# Patient Record
Sex: FEMALE | NOT HISPANIC OR LATINO
[De-identification: names, ages, dates, MRNs, and addresses within clinical notes are randomized per-mention and may not be internally consistent; named-entity substitution may affect disease eponyms.]

---

## 2017-10-17 ENCOUNTER — APPOINTMENT (OUTPATIENT)
Dept: ANTEPARTUM | Facility: CLINIC | Age: 47
End: 2017-10-17
Payer: COMMERCIAL

## 2017-10-17 ENCOUNTER — ASOB RESULT (OUTPATIENT)
Age: 47
End: 2017-10-17

## 2017-10-17 PROCEDURE — 76857 US EXAM PELVIC LIMITED: CPT

## 2017-10-17 PROCEDURE — 76830 TRANSVAGINAL US NON-OB: CPT

## 2017-12-21 ENCOUNTER — OUTPATIENT (OUTPATIENT)
Dept: OUTPATIENT SERVICES | Facility: HOSPITAL | Age: 47
LOS: 1 days | Discharge: HOME | End: 2017-12-21
Payer: COMMERCIAL

## 2017-12-21 ENCOUNTER — RESULT REVIEW (OUTPATIENT)
Age: 47
End: 2017-12-21

## 2017-12-21 VITALS
RESPIRATION RATE: 14 BRPM | SYSTOLIC BLOOD PRESSURE: 113 MMHG | OXYGEN SATURATION: 100 % | TEMPERATURE: 98 F | HEART RATE: 85 BPM | DIASTOLIC BLOOD PRESSURE: 65 MMHG

## 2017-12-21 VITALS
TEMPERATURE: 98 F | HEART RATE: 65 BPM | WEIGHT: 130.07 LBS | SYSTOLIC BLOOD PRESSURE: 103 MMHG | HEIGHT: 64 IN | OXYGEN SATURATION: 100 % | DIASTOLIC BLOOD PRESSURE: 48 MMHG | RESPIRATION RATE: 16 BRPM

## 2017-12-21 DIAGNOSIS — Z87.59 PERSONAL HISTORY OF OTHER COMPLICATIONS OF PREGNANCY, CHILDBIRTH AND THE PUERPERIUM: Chronic | ICD-10-CM

## 2017-12-21 LAB
ABO RH CONFIRMATION: SIGNIFICANT CHANGE UP
BLD GP AB SCN SERPL QL: SIGNIFICANT CHANGE UP
HCG UR QL: NEGATIVE — SIGNIFICANT CHANGE UP
HCT VFR BLD CALC: 33 % — LOW (ref 34.5–45)
HGB BLD-MCNC: 10.4 G/DL — LOW (ref 11.5–15.5)
MCHC RBC-ENTMCNC: 25.4 PG — LOW (ref 27–34)
MCHC RBC-ENTMCNC: 31.6 GM/DL — LOW (ref 32–36)
MCV RBC AUTO: 80.2 FL — SIGNIFICANT CHANGE UP (ref 80–100)
PLATELET # BLD AUTO: 226 K/UL — SIGNIFICANT CHANGE UP (ref 150–400)
RBC # BLD: 4.11 M/UL — SIGNIFICANT CHANGE UP (ref 3.8–5.2)
RBC # FLD: 16.3 % — HIGH (ref 10.3–14.5)
TYPE + AB SCN PNL BLD: SIGNIFICANT CHANGE UP
WBC # BLD: 6.8 K/UL — SIGNIFICANT CHANGE UP (ref 3.8–10.5)
WBC # FLD AUTO: 6.8 K/UL — SIGNIFICANT CHANGE UP (ref 3.8–10.5)

## 2017-12-21 PROCEDURE — 88305 TISSUE EXAM BY PATHOLOGIST: CPT | Mod: 26

## 2017-12-21 RX ORDER — OXYCODONE HYDROCHLORIDE 5 MG/1
5 TABLET ORAL EVERY 4 HOURS
Qty: 0 | Refills: 0 | Status: DISCONTINUED | OUTPATIENT
Start: 2017-12-21 | End: 2017-12-21

## 2017-12-21 RX ORDER — FENTANYL CITRATE 50 UG/ML
50 INJECTION INTRAVENOUS
Qty: 0 | Refills: 0 | Status: DISCONTINUED | OUTPATIENT
Start: 2017-12-21 | End: 2017-12-21

## 2017-12-21 RX ORDER — ONDANSETRON 8 MG/1
4 TABLET, FILM COATED ORAL ONCE
Qty: 0 | Refills: 0 | Status: DISCONTINUED | OUTPATIENT
Start: 2017-12-21 | End: 2017-12-21

## 2017-12-21 RX ORDER — SODIUM CHLORIDE 9 MG/ML
1000 INJECTION INTRAMUSCULAR; INTRAVENOUS; SUBCUTANEOUS
Qty: 0 | Refills: 0 | Status: DISCONTINUED | OUTPATIENT
Start: 2017-12-21 | End: 2017-12-21

## 2017-12-21 RX ORDER — ACETAMINOPHEN 500 MG
1000 TABLET ORAL ONCE
Qty: 0 | Refills: 0 | Status: COMPLETED | OUTPATIENT
Start: 2017-12-21 | End: 2017-12-21

## 2017-12-21 RX ORDER — OXYCODONE HYDROCHLORIDE 5 MG/1
1 TABLET ORAL
Qty: 0 | Refills: 0 | COMMUNITY
Start: 2017-12-21

## 2017-12-21 RX ORDER — FENTANYL CITRATE 50 UG/ML
5 INJECTION INTRAVENOUS
Qty: 0 | Refills: 0 | COMMUNITY
Start: 2017-12-21

## 2017-12-21 RX ADMIN — Medication 1000 MILLIGRAM(S): at 08:42

## 2017-12-21 RX ADMIN — Medication 400 MILLIGRAM(S): at 08:32

## 2017-12-21 RX ADMIN — SODIUM CHLORIDE 75 MILLILITER(S): 9 INJECTION INTRAMUSCULAR; INTRAVENOUS; SUBCUTANEOUS at 08:33

## 2017-12-21 NOTE — BRIEF OPERATIVE NOTE - PROCEDURE
<<-----Click on this checkbox to enter Procedure Ablation of endometrial tissue with NovaSure  12/21/2017    Active  MELITON  Diagnostic hysteroscopy with dilation and curettage of uterus  12/21/2017    Active  [email or web address]

## 2017-12-26 LAB — SURGICAL PATHOLOGY FINAL REPORT - CH: SIGNIFICANT CHANGE UP

## 2017-12-30 DIAGNOSIS — N92.0 EXCESSIVE AND FREQUENT MENSTRUATION WITH REGULAR CYCLE: ICD-10-CM

## 2017-12-30 DIAGNOSIS — N84.0 POLYP OF CORPUS UTERI: ICD-10-CM

## 2018-01-01 ENCOUNTER — TRANSCRIPTION ENCOUNTER (OUTPATIENT)
Age: 48
End: 2018-01-01

## 2018-11-05 ENCOUNTER — TRANSCRIPTION ENCOUNTER (OUTPATIENT)
Age: 48
End: 2018-11-05

## 2019-08-20 PROBLEM — N92.0 EXCESSIVE AND FREQUENT MENSTRUATION WITH REGULAR CYCLE: Chronic | Status: ACTIVE | Noted: 2017-12-21

## 2019-10-24 ENCOUNTER — APPOINTMENT (OUTPATIENT)
Dept: FAMILY MEDICINE | Facility: CLINIC | Age: 49
End: 2019-10-24
Payer: COMMERCIAL

## 2019-10-24 VITALS
SYSTOLIC BLOOD PRESSURE: 110 MMHG | DIASTOLIC BLOOD PRESSURE: 68 MMHG | HEART RATE: 68 BPM | BODY MASS INDEX: 21.17 KG/M2 | HEIGHT: 64 IN | WEIGHT: 124 LBS

## 2019-10-24 PROCEDURE — 90471 IMMUNIZATION ADMIN: CPT

## 2019-10-24 PROCEDURE — 36415 COLL VENOUS BLD VENIPUNCTURE: CPT

## 2019-10-24 PROCEDURE — 99396 PREV VISIT EST AGE 40-64: CPT | Mod: 25

## 2019-10-24 PROCEDURE — 90686 IIV4 VACC NO PRSV 0.5 ML IM: CPT

## 2019-10-24 NOTE — PHYSICAL EXAM
[General Appearance - Well-Appearing] : healthy appearing [General Appearance - Alert] : alert [General Appearance - In No Acute Distress] : in no acute distress [PERRL With Normal Accommodation] : pupils were equal in size, round, and reactive to light [Extraocular Movements] : extraocular movements were intact [Sclera] : the sclera and conjunctiva were normal [Both Tympanic Membranes Were Examined] : both tympanic membranes were normal [Oropharynx] : the oropharynx was normal [Respiration, Rhythm And Depth] : normal respiratory rhythm and effort [Thyroid Diffuse Enlargement] : the thyroid was not enlarged [Auscultation Breath Sounds / Voice Sounds] : lungs were clear to auscultation bilaterally [Heart Rate And Rhythm] : heart rate was normal and rhythm regular [Edema] : there was no peripheral edema [Heart Sounds] : normal S1 and S2 [Abdomen Soft] : soft [Bowel Sounds] : normal bowel sounds [Abdomen Mass (___ Cm)] : no abdominal mass palpated [Cervical Lymph Nodes Enlarged Posterior Bilaterally] : posterior cervical [Cervical Lymph Nodes Enlarged Anterior Bilaterally] : anterior cervical [Supraclavicular Lymph Nodes Enlarged Bilaterally] : supraclavicular [No Spinal Tenderness] : no spinal tenderness [Abnormal Walk] : normal gait [Involuntary Movements] : no involuntary movements were seen [Skin Turgor] : normal skin turgor [No Focal Deficits] : no focal deficits [] : no rash [Deep Tendon Reflexes (DTR)] : deep tendon reflexes were 2+ and symmetric [Oriented To Time, Place, And Person] : oriented to person, place, and time

## 2019-10-27 NOTE — HEALTH RISK ASSESSMENT
[Excellent] : ~his/her~ current health as excellent [Very Good] : ~his/her~  mood as very good [FreeTextEntry1] : her sister recently diagnosis with low grade Lymphoma  [] : No [Yes] : Yes [0] : 2) Feeling down, depressed, or hopeless: Not at all (0) [de-identified] : GYN   Dr. Velarde [de-identified] : SOCIAL  [[id number]] : 0 [Patient reported mammogram was normal] : Patient reported mammogram was normal [Patient reported colonoscopy was normal] : Patient reported colonoscopy was normal [Change in mental status noted] : No change in mental status noted [None] : None [With Family] : lives with family [Employed] : employed [College] : College [# Of Children ___] : has [unfilled] children [Feels Safe at Home] : Feels safe at home [Fully functional (bathing, dressing, toileting, transferring, walking, feeding)] : Fully functional (bathing, dressing, toileting, transferring, walking, feeding) [Fully functional (using the telephone, shopping, preparing meals, housekeeping, doing laundry, using] : Fully functional and needs no help or supervision to perform IADLs (using the telephone, shopping, preparing meals, housekeeping, doing laundry, using transportation, managing medications and managing finances) [Reports changes in hearing] : Reports no changes in hearing [Smoke Detector] : smoke detector [Carbon Monoxide Detector] : carbon monoxide detector [Seat Belt] :  uses seat belt [Travel to Developing Areas] : does not  travel to developing areas [MammogramDate] : 2014  [ColonoscopyDate] : 2016  [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

## 2019-10-27 NOTE — HISTORY OF PRESENT ILLNESS
[de-identified] : Since has been well and \par Denies any recent ER visits/hospitalizations/MSK injuries.\par EM ablation for heavy menses last year.  \par When queried "maybe one" UTI in recent year.  Never consulted with Urology. \par Sister in [place] recently diagnosed with Lymphoma and her concerns heightened for her health.  [FreeTextEntry1] :  In review: Oct. 2016 \par Audrey presents to establish care being referred to me by patient here, Shanna Tamayo.\par She is an affable 45-year-old female, whose only significant medical history includes recurrent and recent UTI.  She was seen in urgent care and is now on Macrobid.  She has been on prophylactic Macrobid as per her GYN for a few years.  Never and seen in consultation with urology however.\par \par HM: Mammo  2014  tells me WNL...needs FU  GYN  in [place]  Dr. Barnett \par Colonoscopy:  3 years ago; early screening due to complaints of  bloating.  No concerns.  Dietary changes with regard to wheat seem to have improved bloating symptoms. 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

## 2019-10-27 NOTE — ASSESSMENT
[Normal Weight (BMI <25)] : normal weight - BMI <25 [Non - Smoker] : non-smoker [FreeTextEntry2] : continue as described in HPI  [FreeTextEntry1] :  Well exam for 48 year old WF with PMH as stated in HPI / active list. \par \par Management : \par \par See HPI and Plan\par \par Labs in office today.   Will advise. \par \par Best wishes offered !\par

## 2019-10-29 LAB
ALBUMIN SERPL ELPH-MCNC: 4.7 G/DL
ALP BLD-CCNC: 41 U/L
ALT SERPL-CCNC: 11 U/L
ANION GAP SERPL CALC-SCNC: 12 MMOL/L
APPEARANCE: CLEAR
AST SERPL-CCNC: 16 U/L
BASOPHILS # BLD AUTO: 0.03 K/UL
BASOPHILS NFR BLD AUTO: 0.4 %
BILIRUB SERPL-MCNC: 0.2 MG/DL
BILIRUBIN URINE: NEGATIVE
BLOOD URINE: NEGATIVE
BUN SERPL-MCNC: 18 MG/DL
CALCIUM SERPL-MCNC: 9.8 MG/DL
CHLORIDE SERPL-SCNC: 102 MMOL/L
CHOLEST SERPL-MCNC: 223 MG/DL
CHOLEST/HDLC SERPL: 2.7 RATIO
CO2 SERPL-SCNC: 25 MMOL/L
COLOR: YELLOW
CREAT SERPL-MCNC: 0.97 MG/DL
EOSINOPHIL # BLD AUTO: 0.05 K/UL
EOSINOPHIL NFR BLD AUTO: 0.6 %
ESTIMATED AVERAGE GLUCOSE: 97 MG/DL
GLUCOSE QUALITATIVE U: NEGATIVE
GLUCOSE SERPL-MCNC: 79 MG/DL
HBA1C MFR BLD HPLC: 5 %
HCT VFR BLD CALC: 41.8 %
HDLC SERPL-MCNC: 84 MG/DL
HGB BLD-MCNC: 13.9 G/DL
IMM GRANULOCYTES NFR BLD AUTO: 0.2 %
KETONES URINE: NEGATIVE
LDLC SERPL CALC-MCNC: 129 MG/DL
LEUKOCYTE ESTERASE URINE: NEGATIVE
LYMPHOCYTES # BLD AUTO: 1.91 K/UL
LYMPHOCYTES NFR BLD AUTO: 22.4 %
MAN DIFF?: NORMAL
MCHC RBC-ENTMCNC: 31 PG
MCHC RBC-ENTMCNC: 33.3 GM/DL
MCV RBC AUTO: 93.1 FL
MONOCYTES # BLD AUTO: 0.62 K/UL
MONOCYTES NFR BLD AUTO: 7.3 %
NEUTROPHILS # BLD AUTO: 5.89 K/UL
NEUTROPHILS NFR BLD AUTO: 69.1 %
NITRITE URINE: NEGATIVE
PH URINE: 7
PLATELET # BLD AUTO: 267 K/UL
POTASSIUM SERPL-SCNC: 4.6 MMOL/L
PROT SERPL-MCNC: 6.6 G/DL
PROTEIN URINE: NORMAL
RBC # BLD: 4.49 M/UL
RBC # FLD: 12.5 %
SODIUM SERPL-SCNC: 139 MMOL/L
SPECIFIC GRAVITY URINE: 1.03
TRIGL SERPL-MCNC: 51 MG/DL
TSH SERPL-ACNC: 2.1 UIU/ML
UROBILINOGEN URINE: NORMAL
WBC # FLD AUTO: 8.52 K/UL

## 2019-11-07 ENCOUNTER — TRANSCRIPTION ENCOUNTER (OUTPATIENT)
Age: 49
End: 2019-11-07

## 2019-12-13 ENCOUNTER — TRANSCRIPTION ENCOUNTER (OUTPATIENT)
Age: 49
End: 2019-12-13

## 2019-12-20 ENCOUNTER — TRANSCRIPTION ENCOUNTER (OUTPATIENT)
Age: 49
End: 2019-12-20

## 2019-12-23 ENCOUNTER — APPOINTMENT (OUTPATIENT)
Dept: FAMILY MEDICINE | Facility: CLINIC | Age: 49
End: 2019-12-23
Payer: COMMERCIAL

## 2019-12-23 VITALS
SYSTOLIC BLOOD PRESSURE: 127 MMHG | BODY MASS INDEX: 22.02 KG/M2 | HEIGHT: 64 IN | TEMPERATURE: 99.4 F | DIASTOLIC BLOOD PRESSURE: 72 MMHG | WEIGHT: 129 LBS | HEART RATE: 89 BPM

## 2019-12-23 PROCEDURE — 99214 OFFICE O/P EST MOD 30 MIN: CPT

## 2019-12-28 NOTE — ASSESSMENT
[FreeTextEntry1] :  Given prolonged duration of symptoms  and PE will allow for antibiotic therapy.\par \par Supportive and conservative therapies reviewed and advised: to consider:\par Increase fluids  and REST! \par Consider saline nasal spray/ irrigation  3-4 times daily ie: Ocean or Ayr\par Salt water gargles 3-4 times daily\par Cool mist humidifier Vs steam humification of shower;  consider adding eucalyptus oil drops to shower pan \par Vicks vapo rub to chest \par Acetaminophen/Advil for fever,  headache, myalgias\par Add honey /lemon to warm angel luis. \par Cover your mouth when coughing and wash hands frequently to prevent spread to others.\par \par FU in 2-3 days if fails to improve or symptoms worse.\par

## 2019-12-28 NOTE — HISTORY OF PRESENT ILLNESS
[Cold Symptoms] : cold symptoms [___ Weeks ago] :  [unfilled] weeks ago [Cough] : cough [Chills] : chills [Fatigue] : fatigue [Headache] : headache [OTC Remedies] : OTC remedies [At Night] : at night [Worsening] : worsening [Moderate] : moderate [Congestion] : no congestion [Sore Throat] : no sore throat [Paroxysmal] : paroxysmal [Wheezing] : no wheezing [Anorexia] : no anorexia [Shortness Of Breath] : no shortness of breath [Earache] : no earache

## 2019-12-28 NOTE — PHYSICAL EXAM
[Normal Sclera/Conjunctiva] : normal sclera/conjunctiva [No Acute Distress] : no acute distress [Clear to Auscultation] : lungs were clear to auscultation bilaterally [No Lymphadenopathy] : no lymphadenopathy [No Accessory Muscle Use] : no accessory muscle use [Normal S1, S2] : normal S1 and S2 [Regular Rhythm] : with a regular rhythm [Soft] : abdomen soft [Non Tender] : non-tender [No Rash] : no rash [No Focal Deficits] : no focal deficits [Alert and Oriented x3] : oriented to person, place, and time [Normal Insight/Judgement] : insight and judgment were intact [de-identified] : hyperemic  pharynx  glistening  [de-identified] : ill appearing  non toxic  [de-identified] : war and dry

## 2020-01-25 ENCOUNTER — TRANSCRIPTION ENCOUNTER (OUTPATIENT)
Age: 50
End: 2020-01-25

## 2020-01-27 ENCOUNTER — APPOINTMENT (OUTPATIENT)
Dept: FAMILY MEDICINE | Facility: CLINIC | Age: 50
End: 2020-01-27
Payer: COMMERCIAL

## 2020-01-27 VITALS
SYSTOLIC BLOOD PRESSURE: 90 MMHG | HEART RATE: 80 BPM | DIASTOLIC BLOOD PRESSURE: 60 MMHG | TEMPERATURE: 98.3 F | HEIGHT: 64 IN

## 2020-01-27 PROCEDURE — 99214 OFFICE O/P EST MOD 30 MIN: CPT

## 2020-01-27 RX ORDER — ONDANSETRON 4 MG/1
4 TABLET ORAL
Qty: 6 | Refills: 0 | Status: COMPLETED | COMMUNITY
Start: 2020-01-25

## 2020-01-27 RX ORDER — OSELTAMIVIR PHOSPHATE 75 MG/1
75 CAPSULE ORAL
Qty: 10 | Refills: 0 | Status: COMPLETED | COMMUNITY
Start: 2020-01-25

## 2020-01-27 RX ORDER — IPRATROPIUM BROMIDE 42 UG/1
0.06 SPRAY NASAL
Qty: 15 | Refills: 0 | Status: COMPLETED | COMMUNITY
Start: 2020-01-25

## 2020-01-29 NOTE — PHYSICAL EXAM
[No Acute Distress] : no acute distress [Normal Sclera/Conjunctiva] : normal sclera/conjunctiva [No Lymphadenopathy] : no lymphadenopathy [No Accessory Muscle Use] : no accessory muscle use [Clear to Auscultation] : lungs were clear to auscultation bilaterally [Regular Rhythm] : with a regular rhythm [Normal S1, S2] : normal S1 and S2 [No Rash] : no rash [No Focal Deficits] : no focal deficits [Alert and Oriented x3] : oriented to person, place, and time [de-identified] : calm and engaging  [de-identified] : camilla [de-identified] : warm and dry

## 2020-01-29 NOTE — HISTORY OF PRESENT ILLNESS
[Cough] : cough [Sore Throat] : sore throat [Fever] : fever [Mild] : mild [___ Days ago] : [unfilled] days ago [Congestion] : no congestion [Chills] : no chills [Fatigue] : not fatigue [Improving] : improving [FreeTextEntry8] : pt went to a urgent care on Saturday the prescribe her Tamiflu she is being taking Tamiflu for 3 days she is feeling better.

## 2020-02-05 ENCOUNTER — FORM ENCOUNTER (OUTPATIENT)
Age: 50
End: 2020-02-05

## 2020-02-06 ENCOUNTER — APPOINTMENT (OUTPATIENT)
Dept: FAMILY MEDICINE | Facility: CLINIC | Age: 50
End: 2020-02-06
Payer: COMMERCIAL

## 2020-02-06 ENCOUNTER — APPOINTMENT (OUTPATIENT)
Dept: RADIOLOGY | Facility: CLINIC | Age: 50
End: 2020-02-06
Payer: COMMERCIAL

## 2020-02-06 ENCOUNTER — OUTPATIENT (OUTPATIENT)
Dept: OUTPATIENT SERVICES | Facility: HOSPITAL | Age: 50
LOS: 1 days | End: 2020-02-06
Payer: COMMERCIAL

## 2020-02-06 VITALS
SYSTOLIC BLOOD PRESSURE: 110 MMHG | WEIGHT: 123 LBS | BODY MASS INDEX: 21 KG/M2 | HEART RATE: 91 BPM | OXYGEN SATURATION: 98 % | DIASTOLIC BLOOD PRESSURE: 70 MMHG | HEIGHT: 64 IN

## 2020-02-06 DIAGNOSIS — R05 COUGH: ICD-10-CM

## 2020-02-06 DIAGNOSIS — Z87.59 PERSONAL HISTORY OF OTHER COMPLICATIONS OF PREGNANCY, CHILDBIRTH AND THE PUERPERIUM: Chronic | ICD-10-CM

## 2020-02-06 PROCEDURE — 71046 X-RAY EXAM CHEST 2 VIEWS: CPT

## 2020-02-06 PROCEDURE — 99214 OFFICE O/P EST MOD 30 MIN: CPT

## 2020-02-06 PROCEDURE — 71046 X-RAY EXAM CHEST 2 VIEWS: CPT | Mod: 26

## 2020-02-06 NOTE — HISTORY OF PRESENT ILLNESS
[Severe] : severe [___ Weeks ago] :  [unfilled] weeks ago [Constant] : constant [Cough] : cough [Fatigue] : fatigue [Activity] : with activity [At Night] : at night [In Morning] : in the morning [Worsening] : worsening [Congestion] : no congestion [Sore Throat] : no sore throat [Wheezing] : no wheezing [Chills] : no chills [Anorexia] : no anorexia [Earache] : no earache [Shortness Of Breath] : no shortness of breath [Headache] : no headache [FreeTextEntry5] : NONE [Fever] : no fever [FreeTextEntry8] : Last seen for same JANUARY 27.\par DID improve "a bit" but cough persists sometimes productive for clear secretions. \par NO SOB  Poor sleep. \par Steam  eases symptoms.

## 2020-02-06 NOTE — REVIEW OF SYSTEMS
[Chills] : chills [Negative] : Neurological [Fever] : no fever [Skin Rash] : no skin rash [FreeTextEntry7] : poor appetite

## 2020-02-06 NOTE — ASSESSMENT
[FreeTextEntry1] : COUGH  likely post viral   given prolonged duration will obtain CXR\par \par COugh suppressant provided.

## 2020-02-06 NOTE — PHYSICAL EXAM
[No Acute Distress] : no acute distress [No Lymphadenopathy] : no lymphadenopathy [Normal Sclera/Conjunctiva] : normal sclera/conjunctiva [No Accessory Muscle Use] : no accessory muscle use [Clear to Auscultation] : lungs were clear to auscultation bilaterally [Regular Rhythm] : with a regular rhythm [No Rash] : no rash [Normal S1, S2] : normal S1 and S2 [No Focal Deficits] : no focal deficits [Alert and Oriented x3] : oriented to person, place, and time [de-identified] : non toxic appearing   [de-identified] : OMM  hyperemic pharynx  glistening  [de-identified] : decreased BS  [de-identified] : morris

## 2020-10-26 ENCOUNTER — APPOINTMENT (OUTPATIENT)
Dept: FAMILY MEDICINE | Facility: CLINIC | Age: 50
End: 2020-10-26

## 2020-11-10 ENCOUNTER — APPOINTMENT (OUTPATIENT)
Dept: FAMILY MEDICINE | Facility: CLINIC | Age: 50
End: 2020-11-10
Payer: COMMERCIAL

## 2020-11-10 ENCOUNTER — LABORATORY RESULT (OUTPATIENT)
Age: 50
End: 2020-11-10

## 2020-11-10 VITALS
TEMPERATURE: 97.1 F | BODY MASS INDEX: 22.2 KG/M2 | OXYGEN SATURATION: 99 % | RESPIRATION RATE: 12 BRPM | HEIGHT: 64 IN | DIASTOLIC BLOOD PRESSURE: 80 MMHG | HEART RATE: 71 BPM | SYSTOLIC BLOOD PRESSURE: 112 MMHG | WEIGHT: 130 LBS

## 2020-11-10 PROCEDURE — 36415 COLL VENOUS BLD VENIPUNCTURE: CPT

## 2020-11-10 PROCEDURE — 99396 PREV VISIT EST AGE 40-64: CPT | Mod: 25

## 2020-11-10 RX ORDER — BENZONATATE 100 MG/1
100 CAPSULE ORAL
Qty: 30 | Refills: 0 | Status: DISCONTINUED | COMMUNITY
Start: 2020-01-30 | End: 2020-11-10

## 2020-11-10 RX ORDER — AZITHROMYCIN 250 MG/1
250 TABLET, FILM COATED ORAL
Qty: 1 | Refills: 0 | Status: DISCONTINUED | COMMUNITY
Start: 2020-01-30 | End: 2020-11-10

## 2020-11-10 RX ORDER — GUAIFENESIN AND CODEINE PHOSPHATE 10; 100 MG/5ML; MG/5ML
100-10 SOLUTION ORAL
Qty: 1 | Refills: 0 | Status: DISCONTINUED | COMMUNITY
Start: 2020-02-06 | End: 2020-11-10

## 2020-11-10 RX ORDER — AZITHROMYCIN 250 MG/1
250 TABLET, FILM COATED ORAL
Qty: 1 | Refills: 0 | Status: DISCONTINUED | COMMUNITY
Start: 2019-12-23 | End: 2020-11-10

## 2020-11-10 RX ORDER — PROMETHAZINE HYDROCHLORIDE 6.25 MG/5ML
6.25 SOLUTION ORAL
Qty: 118 | Refills: 0 | Status: DISCONTINUED | COMMUNITY
Start: 2019-12-23 | End: 2020-11-10

## 2020-11-12 LAB
ALBUMIN SERPL ELPH-MCNC: 4.8 G/DL
ALP BLD-CCNC: 50 U/L
ALT SERPL-CCNC: 10 U/L
ANION GAP SERPL CALC-SCNC: 11 MMOL/L
APPEARANCE: CLEAR
AST SERPL-CCNC: 19 U/L
BASOPHILS # BLD AUTO: 0.04 K/UL
BASOPHILS NFR BLD AUTO: 0.5 %
BILIRUB SERPL-MCNC: 0.4 MG/DL
BILIRUBIN URINE: NEGATIVE
BLOOD URINE: NEGATIVE
BUN SERPL-MCNC: 10 MG/DL
CALCIUM SERPL-MCNC: 9.9 MG/DL
CHLORIDE SERPL-SCNC: 102 MMOL/L
CHOLEST SERPL-MCNC: 242 MG/DL
CO2 SERPL-SCNC: 27 MMOL/L
COLOR: YELLOW
CREAT SERPL-MCNC: 0.64 MG/DL
EOSINOPHIL # BLD AUTO: 0.05 K/UL
EOSINOPHIL NFR BLD AUTO: 0.6 %
ESTIMATED AVERAGE GLUCOSE: 97 MG/DL
GLUCOSE QUALITATIVE U: NEGATIVE
GLUCOSE SERPL-MCNC: 84 MG/DL
HBA1C MFR BLD HPLC: 5 %
HCT VFR BLD CALC: 45.5 %
HDLC SERPL-MCNC: 101 MG/DL
HGB BLD-MCNC: 14.6 G/DL
IMM GRANULOCYTES NFR BLD AUTO: 0.2 %
KETONES URINE: NEGATIVE
LDLC SERPL CALC-MCNC: 129 MG/DL
LEUKOCYTE ESTERASE URINE: ABNORMAL
LYMPHOCYTES # BLD AUTO: 2.01 K/UL
LYMPHOCYTES NFR BLD AUTO: 22.9 %
MAN DIFF?: NORMAL
MCHC RBC-ENTMCNC: 31.3 PG
MCHC RBC-ENTMCNC: 32.1 GM/DL
MCV RBC AUTO: 97.4 FL
MONOCYTES # BLD AUTO: 0.51 K/UL
MONOCYTES NFR BLD AUTO: 5.8 %
NEUTROPHILS # BLD AUTO: 6.15 K/UL
NEUTROPHILS NFR BLD AUTO: 70 %
NITRITE URINE: NEGATIVE
NONHDLC SERPL-MCNC: 141 MG/DL
PH URINE: 6.5
PLATELET # BLD AUTO: 284 K/UL
POTASSIUM SERPL-SCNC: 5.1 MMOL/L
PROT SERPL-MCNC: 6.9 G/DL
PROTEIN URINE: NORMAL
RBC # BLD: 4.67 M/UL
RBC # FLD: 12.5 %
SODIUM SERPL-SCNC: 139 MMOL/L
SPECIFIC GRAVITY URINE: 1.02
TRIGL SERPL-MCNC: 58 MG/DL
TSH SERPL-ACNC: 2.05 UIU/ML
UROBILINOGEN URINE: NORMAL
WBC # FLD AUTO: 8.78 K/UL

## 2020-11-17 NOTE — ASSESSMENT
[FreeTextEntry1] :  Well exam for 49  year old female  with PMH as stated in HPI / active list. \par \par Management :   Has MAMMO req. from GYN\par                          Referred for colonoscopy \par Declines FLU vaccine as her concerns are heightened since she was so sick last year after the flu shot.\par advised to reconsider and educated\par \par \par Referral to URO GYN given. \par \par See HPI and Plan\par \par Labs in office today.   Will advise. \par \par Best wishes offered !\par

## 2020-11-17 NOTE — HISTORY OF PRESENT ILLNESS
[de-identified] : Since has been well and \par Denies any recent ER visits/hospitalizations/MSK injuries.\par EM ablation for heavy menses last year.  \par When queried "maybe one" UTI in recent year.  Never consulted with Urology. \par Sister in [place] recently diagnosed with Lymphoma and her concerns heightened for her health.  [FreeTextEntry1] :  In review: Oct. 2016 \par Audrey presents to establish care being referred to me by patient here, Shanna Tamayo.\par She is an affable 45-year-old female, whose only significant medical history includes recurrent and recent UTI.  She was seen in urgent care and is now on Macrobid.  She has been on prophylactic Macrobid as per her GYN for a few years.  Never and seen in consultation with urology however.\par \par HM: Mammo  2014  tells me WNL...needs FU  GYN  in [place]  Dr. Barnett \par Colonoscopy:  3 years ago; early screening due to complaints of  bloating.  No concerns.  Dietary changes with regard to wheat seem to have improved bloating symptoms. 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

## 2020-11-17 NOTE — HEALTH RISK ASSESSMENT
[0] : 2) Feeling down, depressed, or hopeless: Not at all (0) [Excellent] : ~his/her~ current health as excellent [Very Good] : ~his/her~  mood as very good [Yes] : Yes [Patient reported mammogram was normal] : Patient reported mammogram was normal [Patient reported colonoscopy was normal] : Patient reported colonoscopy was normal [None] : None [With Family] : lives with family [Employed] : employed [College] : College [# Of Children ___] : has [unfilled] children [Feels Safe at Home] : Feels safe at home [Fully functional (bathing, dressing, toileting, transferring, walking, feeding)] : Fully functional (bathing, dressing, toileting, transferring, walking, feeding) [Fully functional (using the telephone, shopping, preparing meals, housekeeping, doing laundry, using] : Fully functional and needs no help or supervision to perform IADLs (using the telephone, shopping, preparing meals, housekeeping, doing laundry, using transportation, managing medications and managing finances) [Smoke Detector] : smoke detector [Carbon Monoxide Detector] : carbon monoxide detector [Seat Belt] :  uses seat belt [] : No [de-identified] : GYN   Dr. Velarde [de-identified] : SOCIAL  [[id number]] : 0 [Change in mental status noted] : No change in mental status noted [Reports changes in hearing] : Reports no changes in hearing [Travel to Developing Areas] : does not  travel to developing areas [MammogramDate] : 2014  [MammogramComments] : declines screenings.  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

## 2020-11-17 NOTE — PHYSICAL EXAM
[General Appearance - Alert] : alert [General Appearance - Well-Appearing] : healthy appearing [Sclera] : the sclera and conjunctiva were normal [PERRL With Normal Accommodation] : pupils were equal in size, round, and reactive to light [Extraocular Movements] : extraocular movements were intact [Both Tympanic Membranes Were Examined] : both tympanic membranes were normal [Oropharynx] : the oropharynx was normal [Thyroid Diffuse Enlargement] : the thyroid was not enlarged [Respiration, Rhythm And Depth] : normal respiratory rhythm and effort [Auscultation Breath Sounds / Voice Sounds] : lungs were clear to auscultation bilaterally [Heart Rate And Rhythm] : heart rate was normal and rhythm regular [Heart Sounds] : normal S1 and S2 [Edema] : there was no peripheral edema [Bowel Sounds] : normal bowel sounds [Abdomen Soft] : soft [Abdomen Mass (___ Cm)] : no abdominal mass palpated [Cervical Lymph Nodes Enlarged Posterior Bilaterally] : posterior cervical [Cervical Lymph Nodes Enlarged Anterior Bilaterally] : anterior cervical [Supraclavicular Lymph Nodes Enlarged Bilaterally] : supraclavicular [No Spinal Tenderness] : no spinal tenderness [Abnormal Walk] : normal gait [Involuntary Movements] : no involuntary movements were seen [Skin Turgor] : normal skin turgor [] : no rash [Deep Tendon Reflexes (DTR)] : deep tendon reflexes were 2+ and symmetric [No Focal Deficits] : no focal deficits [Oriented To Time, Place, And Person] : oriented to person, place, and time [No CVA Tenderness] : no ~M costovertebral angle tenderness [FreeTextEntry1] : MASK

## 2021-03-15 RX ORDER — NITROFURANTOIN MACROCRYSTALS 100 MG/1
100 CAPSULE ORAL
Qty: 10 | Refills: 0 | Status: DISCONTINUED | COMMUNITY
Start: 2020-11-12 | End: 2021-03-15

## 2021-03-15 NOTE — LETTER BODY
[Dear  ___] : Dear  [unfilled], [I had the pleasure of evaluating your patient, [unfilled]. Thank you for referring Ms. [unfilled] for consultation for ___] : I had the pleasure of evaluating your patient, [unfilled]. Thank you for referring Ms. [unfilled] for consultation for [unfilled].

## 2021-03-15 NOTE — REASON FOR VISIT
[Initial Visit ___] : an initial visit for [unfilled] [Nocturia] : nocturia [Urinary Incontinence] : urinary incontinence [Recurrent Urinary Infections] : recurrent urinary infections

## 2021-03-18 ENCOUNTER — APPOINTMENT (OUTPATIENT)
Age: 51
End: 2021-03-18
Payer: COMMERCIAL

## 2021-03-18 ENCOUNTER — RESULT CHARGE (OUTPATIENT)
Age: 51
End: 2021-03-18

## 2021-03-18 VITALS
HEIGHT: 64 IN | DIASTOLIC BLOOD PRESSURE: 73 MMHG | SYSTOLIC BLOOD PRESSURE: 109 MMHG | WEIGHT: 125 LBS | BODY MASS INDEX: 21.34 KG/M2

## 2021-03-18 DIAGNOSIS — R35.1 NOCTURIA: ICD-10-CM

## 2021-03-18 LAB
BILIRUB UR QL STRIP: NEGATIVE
CLARITY UR: CLEAR
COLLECTION METHOD: NORMAL
GLUCOSE UR-MCNC: NEGATIVE
HCG UR QL: 0.2 EU/DL
HGB UR QL STRIP.AUTO: NEGATIVE
KETONES UR-MCNC: NEGATIVE
LEUKOCYTE ESTERASE UR QL STRIP: NEGATIVE
NITRITE UR QL STRIP: NEGATIVE
PH UR STRIP: 7
PROT UR STRIP-MCNC: NEGATIVE
SP GR UR STRIP: 1.01

## 2021-03-18 PROCEDURE — 99072 ADDL SUPL MATRL&STAF TM PHE: CPT

## 2021-03-18 PROCEDURE — 51701 INSERT BLADDER CATHETER: CPT

## 2021-03-18 PROCEDURE — 99205 OFFICE O/P NEW HI 60 MIN: CPT | Mod: 25

## 2021-03-18 NOTE — DISCUSSION/SUMMARY
[FreeTextEntry1] : 51 yo presenting with OAB symptoms, mostly at night. On exam normal PVR, no prolapse, negative CST. We reviewed her symptoms and exam findings. We discussed management options for overactive bladder including observation, behavorial modifications and bladder training, physical therapy and medications including anticholinergics and beta 3 agonists. We discussed if behavorial modifications and medications fail proceeding with urodynamics to further evaluate her symptoms. We discussed additional treatment options including sacral neuromodulation, PTNS and intra detrusor Botox. IUGA handout on overactive bladder and bladder training was given to her.\par \par Counseled on lifestyle modifications, fluid management and possible medications. Will start with lifestyle modification and fluid management and return in 2 months to reassess symptoms.  All questions were answered. \par

## 2021-03-18 NOTE — PHYSICAL EXAM
[No Acute Distress] : in no acute distress [Well developed] : well developed [Well Nourished] : ~L well nourished [Good Hygeine] : demonstrates good hygeine [Oriented x3] : oriented to person, place, and time [Normal Mood/Affect] : mood and affect are normal [No Edema] : ~T edema was not present [No Lesions] : no lesions were seen on the external genitalia [Labia Majora] : were normal [Labia Minora] : were normal [Normal Appearance] : general appearance was normal [No Bleeding] : there was no active vaginal bleeding [Aa ____] : Aa [unfilled] [C ____] : C [unfilled] [GH ____] : GH [unfilled] [PB ____] : PB [unfilled] [TVL ____] : TVL  [unfilled] [Ap ____] : Ap [unfilled] [Bp ____] : Bp [unfilled] [D ____] : D [unfilled] [Normal Position] : in a normal position [Chaperone Present] : A chaperone was present in the examining room during all aspects of the physical examination [Normal Memory] : ~T memory was ~L unimpaired [Cough] : no cough [Tenderness] : ~T no ~M abdominal tenderness observed [Distended] : not distended [Hernia] : no hernia observed [None] : no CVA tenderness [Inguinal LAD] : no adenopathy was noted in the inguinal lymph nodes [Warm and Dry] : was warm and dry to touch [Normal Gait] : gait was normal [Ba ____] : Ba [unfilled] [Normal] : no abnormalities [Post Void Residual ____ml] : post void residual was [unfilled] ml [FreeTextEntry3] : neg CST

## 2021-03-18 NOTE — OB HISTORY
[Total Preg ___] : : [unfilled] [Full Term ___] : [unfilled] (full-term) [Premature ___] : [unfilled] (premature) [AB Spont ___] : [unfilled] miscarriage(s) [Definite ___ (Date)] : the last menstrual period was [unfilled] [Last Pap Smear ___] : date of last pap smear was on [unfilled] [Sexually Active] : sexually active [ ___] : [unfilled]  section delivery(s) [Regular Cycle Intervals] : periods have been irregular [Abnormal Pap Smear] : normal pap smear [Taking Estrogens] : is not taking estrogen replacement [Abnormal Bleeding] : without bleeding

## 2021-03-18 NOTE — HISTORY OF PRESENT ILLNESS
[Cystocele (Obstetric)] : no [Vaginal Wall Prolapse] : no [Rectal Prolapse] : no [Unable To Restrain Bowel Movement] : mild [x3+] : nocturia three or more  times a night [Feelings Of Urinary Urgency] : no [Pain During Urination (Dysuria)] : no [Urinary Tract Infection] : moderate [Incomplete Emptying Of Stool] : no [] : no [Pelvic Pain] : no [Vaginal Pain] : no [Vulvar Pain] : no [FreeTextEntry1] : 51 yo P2  (CS x2). Feels like she has to go 3-4 times per night with urgency and voiding small amounts. She has had a few episodes of leaking with a strong urge, though it's not frequent. This has been going on for a few years, but it's getting worse. No hematuria. No burning with urination. Some small leaking with laugh/cough/sneeze. No pelvic pressure or bulge coming out of vagina. She drinks 6-7 cups of flat water and two espresso's per day.,\par \par Gyn hx: no abnormal pap. LMP in 2018 s/p ablation in 2018. \par Has had many UTI throughout life, but last one > 1 years ago\par \par PMH: none\par PSH: CS x2, endometrial ablation\par Meds: none

## 2021-05-19 ENCOUNTER — APPOINTMENT (OUTPATIENT)
Dept: UROGYNECOLOGY | Facility: CLINIC | Age: 51
End: 2021-05-19

## 2021-07-31 ENCOUNTER — TRANSCRIPTION ENCOUNTER (OUTPATIENT)
Age: 51
End: 2021-07-31

## 2021-08-02 ENCOUNTER — NON-APPOINTMENT (OUTPATIENT)
Age: 51
End: 2021-08-02

## 2021-08-02 ENCOUNTER — APPOINTMENT (OUTPATIENT)
Dept: FAMILY MEDICINE | Facility: CLINIC | Age: 51
End: 2021-08-02
Payer: COMMERCIAL

## 2021-08-02 VITALS
OXYGEN SATURATION: 99 % | DIASTOLIC BLOOD PRESSURE: 62 MMHG | SYSTOLIC BLOOD PRESSURE: 110 MMHG | WEIGHT: 122 LBS | BODY MASS INDEX: 20.83 KG/M2 | HEART RATE: 74 BPM | HEIGHT: 64 IN

## 2021-08-02 DIAGNOSIS — N39.0 URINARY TRACT INFECTION, SITE NOT SPECIFIED: ICD-10-CM

## 2021-08-02 DIAGNOSIS — R35.0 FREQUENCY OF MICTURITION: ICD-10-CM

## 2021-08-02 PROCEDURE — 99214 OFFICE O/P EST MOD 30 MIN: CPT

## 2021-08-07 PROBLEM — R35.0 URINARY FREQUENCY: Status: ACTIVE | Noted: 2021-03-15

## 2021-08-07 NOTE — ADDENDUM
[FreeTextEntry1] : I, Bob Herrera acting as a scribe for Dr. Najma Arroyo MD on 08/02/2021 at 1:24 PM.

## 2021-08-07 NOTE — HISTORY OF PRESENT ILLNESS
[FreeTextEntry1] : F/u\par Last seen 11/10/20 for CPE, encounter reviewed.\par She has received the COVID-19 vaccination.\par \par DID Consult with URO GYN in March 2021 and encounter reviewed.\par OAB ; conservative management.  [de-identified] : CARMEN ARCHER is a 50 year old female who presents for follow-up evaluation.\par C/o diarrhea for 2 weeks, fecal urgency approx. 2x a day. Typical smell.\par NO fever NO significant abdominal pain \par Also c/o post-nasal drip.  "Seasonal"  mild ST. \par She confirms improvement past 2 days.\par No other complaints. Previously Negative (within the last year)

## 2021-08-07 NOTE — PLAN
[FreeTextEntry1] : F/u for 50 year old F with PMH as stated in HPI / active list. \par \par Management : \par \par See HPI and Plan\par \par Resolving gastroenteritis \par \par Referral for GI. for screening  Colonoscopy\par \par Advised salt water gargles for post-nasal drip.  Declines nasal spray \par \par Best wishes offered!

## 2021-08-07 NOTE — END OF VISIT
[FreeTextEntry3] : Medical record entries made by the scribe today, were at my direction and personally dictated to them by me, Dr. Najma Arroyo on 08/02/2021. I have reviewed the chart and agree that the record accurately reflects my personal performance of the history, physical exam, assessment and plan.\par

## 2021-12-06 ENCOUNTER — APPOINTMENT (OUTPATIENT)
Dept: FAMILY MEDICINE | Facility: CLINIC | Age: 51
End: 2021-12-06
Payer: COMMERCIAL

## 2021-12-06 VITALS
BODY MASS INDEX: 21.68 KG/M2 | WEIGHT: 127 LBS | SYSTOLIC BLOOD PRESSURE: 90 MMHG | DIASTOLIC BLOOD PRESSURE: 60 MMHG | HEIGHT: 64 IN | HEART RATE: 68 BPM

## 2021-12-06 DIAGNOSIS — J30.9 ALLERGIC RHINITIS, UNSPECIFIED: ICD-10-CM

## 2021-12-06 DIAGNOSIS — Z23 ENCOUNTER FOR IMMUNIZATION: ICD-10-CM

## 2021-12-06 PROCEDURE — 90686 IIV4 VACC NO PRSV 0.5 ML IM: CPT

## 2021-12-06 PROCEDURE — 99396 PREV VISIT EST AGE 40-64: CPT | Mod: 25

## 2021-12-06 PROCEDURE — 36415 COLL VENOUS BLD VENIPUNCTURE: CPT

## 2021-12-06 PROCEDURE — G0008: CPT

## 2021-12-06 RX ORDER — FLUTICASONE PROPIONATE 50 UG/1
50 SPRAY, METERED NASAL
Qty: 16 | Refills: 0 | Status: ACTIVE | COMMUNITY
Start: 2021-07-31

## 2021-12-11 PROBLEM — J30.9 ALLERGIC RHINITIS: Status: ACTIVE | Noted: 2021-08-07

## 2021-12-11 NOTE — HISTORY OF PRESENT ILLNESS
[de-identified] : In review 2020:\par For CPE \par Last seen for same October. 2019.\par In recent months had 2 events of URI and cough with malaise and myalgias;  Tells me she is COVID negative antibodies.  \par Also reports intermittent,  recurrent symptoms of urinary frequency and urgency  and suprapubic discomforts . Did not Consult with urology as advised however "I will now".  NO UC encounters for same. \par \par Social: her boss has suddenly retired ( due to illness) and she is now in his position.  stress heightened.\par             Sister with Lymphoma "doing ok".\par Otherwise stable. \par Since has been well and \par Denies any recent ER visits/hospitalizations/MSK injuries.\par EM ablation for heavy menses last year.  \par When queried "maybe one" UTI in recent year.  Never consulted with Urology. \par Sister in [place] recently diagnosed with Lymphoma and her concerns heightened for her health.  [FreeTextEntry1] :  In review: Oct. 2016 \par Audrey presents to establish care being referred to me by patient here, Shanna Tamayo.\par She is an affable 45-year-old female, whose only significant medical history includes recurrent and recent UTI.  She was seen in urgent care and is now on Macrobid.  She has been on prophylactic Macrobid as per her GYN for a few years.  Never and seen in consultation with urology however.\par \par HM: Mammo  2014  tells me WNL...needs FU  GYN  in [place]  Dr. Barnett \par Colonoscopy:  3 years ago; early screening due to complaints of  bloating.  No concerns.  Dietary changes with regard to wheat seem to have improved bloating symptoms. 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

## 2021-12-11 NOTE — REVIEW OF SYSTEMS
[Eyesight Problems] : eyesight problems [Negative] : Musculoskeletal [Fever] : no fever [Chills] : no chills [Feeling Poorly] : not feeling poorly

## 2021-12-11 NOTE — COUNSELING
[FreeTextEntry2] : \par  [de-identified] : Encouraged  to continue current healthy lifestyle behaviors.\par

## 2021-12-11 NOTE — ASSESSMENT
[FreeTextEntry1] : CPE for 50 year old WF with PMH as stated in HPI / active list. \par \par Management : \par \par See HPI and Plan.\par \par Labs in office today, repeated from November, will advise.\par \par Pt to call office and provide name of facility where mammograms are completed.\par \par Best wishes offered!

## 2021-12-11 NOTE — ADDENDUM
[FreeTextEntry1] : Medical record entries made by the scribe today, were at my direction and personally dictated to them by me, Dr. Najma Arroyo on 12/06/2021.  I have reviewed the chart and agree that the record accurately reflects my personal performance of the history, physical exam, assessment and plan.\par \par Bruce GARCÍA acting as scribe for Dr. Najma Arroyo MD on 12/06/2021 at 10:48 AM.\par

## 2021-12-11 NOTE — PHYSICAL EXAM
[General Appearance - Alert] : alert [General Appearance - Well-Appearing] : healthy appearing [Sclera] : the sclera and conjunctiva were normal [PERRL With Normal Accommodation] : pupils were equal in size, round, and reactive to light [Extraocular Movements] : extraocular movements were intact [Both Tympanic Membranes Were Examined] : both tympanic membranes were normal [Oropharynx] : the oropharynx was normal [Thyroid Diffuse Enlargement] : the thyroid was not enlarged [Respiration, Rhythm And Depth] : normal respiratory rhythm and effort [Auscultation Breath Sounds / Voice Sounds] : lungs were clear to auscultation bilaterally [Heart Rate And Rhythm] : heart rate was normal and rhythm regular [Heart Sounds] : normal S1 and S2 [Edema] : there was no peripheral edema [Bowel Sounds] : normal bowel sounds [Abdomen Soft] : soft [Abdomen Mass (___ Cm)] : no abdominal mass palpated [Cervical Lymph Nodes Enlarged Posterior Bilaterally] : posterior cervical [Cervical Lymph Nodes Enlarged Anterior Bilaterally] : anterior cervical [Supraclavicular Lymph Nodes Enlarged Bilaterally] : supraclavicular [No CVA Tenderness] : no ~M costovertebral angle tenderness [No Spinal Tenderness] : no spinal tenderness [Abnormal Walk] : normal gait [Involuntary Movements] : no involuntary movements were seen [Skin Turgor] : normal skin turgor [] : no rash [Deep Tendon Reflexes (DTR)] : deep tendon reflexes were 2+ and symmetric [No Focal Deficits] : no focal deficits [Oriented To Time, Place, And Person] : oriented to person, place, and time [FreeTextEntry1] : notes changes in vision, see HPI

## 2021-12-11 NOTE — HEALTH RISK ASSESSMENT
[Excellent] : ~his/her~ current health as excellent [Very Good] : ~his/her~  mood as very good [Yes] : Yes [0] : 2) Feeling down, depressed, or hopeless: Not at all (0) [Patient reported mammogram was normal] : Patient reported mammogram was normal [Patient reported colonoscopy was normal] : Patient reported colonoscopy was normal [None] : None [With Family] : lives with family [Employed] : employed [College] : College [# Of Children ___] : has [unfilled] children [Feels Safe at Home] : Feels safe at home [Fully functional (bathing, dressing, toileting, transferring, walking, feeding)] : Fully functional (bathing, dressing, toileting, transferring, walking, feeding) [Fully functional (using the telephone, shopping, preparing meals, housekeeping, doing laundry, using] : Fully functional and needs no help or supervision to perform IADLs (using the telephone, shopping, preparing meals, housekeeping, doing laundry, using transportation, managing medications and managing finances) [Smoke Detector] : smoke detector [Carbon Monoxide Detector] : carbon monoxide detector [Seat Belt] :  uses seat belt [] : No [de-identified] : GYN   Dr. Velarde [de-identified] : SOCIAL  [[id number]] : 0 [Change in mental status noted] : No change in mental status noted [Reports changes in hearing] : Reports no changes in hearing [Travel to Developing Areas] : does not  travel to developing areas [MammogramDate] : 2021 [MammogramComments] : Pt to follow with name of facility, report to be reviewed. 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

## 2021-12-16 LAB
ALBUMIN SERPL ELPH-MCNC: 4.6 G/DL
ALP BLD-CCNC: 42 U/L
ALT SERPL-CCNC: 11 U/L
ANION GAP SERPL CALC-SCNC: 14 MMOL/L
AST SERPL-CCNC: 18 U/L
BASOPHILS # BLD AUTO: 0.06 K/UL
BASOPHILS NFR BLD AUTO: 0.8 %
BILIRUB SERPL-MCNC: 0.5 MG/DL
BUN SERPL-MCNC: 15 MG/DL
CALCIUM SERPL-MCNC: 9.7 MG/DL
CEA SERPL-MCNC: 2.2 NG/ML
CHLORIDE SERPL-SCNC: 102 MMOL/L
CHOLEST SERPL-MCNC: 225 MG/DL
CO2 SERPL-SCNC: 21 MMOL/L
CREAT SERPL-MCNC: 0.7 MG/DL
EOSINOPHIL # BLD AUTO: 0.02 K/UL
EOSINOPHIL NFR BLD AUTO: 0.3 %
ESTIMATED AVERAGE GLUCOSE: 103 MG/DL
GLUCOSE SERPL-MCNC: 76 MG/DL
HBA1C MFR BLD HPLC: 5.2 %
HCT VFR BLD CALC: 42.5 %
HDLC SERPL-MCNC: 94 MG/DL
HGB BLD-MCNC: 13.9 G/DL
IMM GRANULOCYTES NFR BLD AUTO: 0.3 %
LDLC SERPL CALC-MCNC: 120 MG/DL
LYMPHOCYTES # BLD AUTO: 1.8 K/UL
LYMPHOCYTES NFR BLD AUTO: 24.4 %
MAN DIFF?: NORMAL
MCHC RBC-ENTMCNC: 30.8 PG
MCHC RBC-ENTMCNC: 32.7 GM/DL
MCV RBC AUTO: 94 FL
MONOCYTES # BLD AUTO: 0.56 K/UL
MONOCYTES NFR BLD AUTO: 7.6 %
NEUTROPHILS # BLD AUTO: 4.92 K/UL
NEUTROPHILS NFR BLD AUTO: 66.6 %
NONHDLC SERPL-MCNC: 131 MG/DL
PLATELET # BLD AUTO: 226 K/UL
POTASSIUM SERPL-SCNC: 4.4 MMOL/L
PROT SERPL-MCNC: 7 G/DL
RBC # BLD: 4.52 M/UL
RBC # FLD: 13 %
SODIUM SERPL-SCNC: 137 MMOL/L
TRIGL SERPL-MCNC: 53 MG/DL
TSH SERPL-ACNC: 2.87 UIU/ML
WBC # FLD AUTO: 7.38 K/UL

## 2022-02-08 ENCOUNTER — APPOINTMENT (OUTPATIENT)
Dept: GASTROENTEROLOGY | Facility: CLINIC | Age: 52
End: 2022-02-08
Payer: COMMERCIAL

## 2022-02-08 VITALS
WEIGHT: 125 LBS | DIASTOLIC BLOOD PRESSURE: 71 MMHG | SYSTOLIC BLOOD PRESSURE: 110 MMHG | HEIGHT: 64 IN | BODY MASS INDEX: 21.34 KG/M2 | HEART RATE: 70 BPM

## 2022-02-08 DIAGNOSIS — Z12.11 ENCOUNTER FOR SCREENING FOR MALIGNANT NEOPLASM OF COLON: ICD-10-CM

## 2022-02-08 PROCEDURE — 99202 OFFICE O/P NEW SF 15 MIN: CPT

## 2022-02-22 NOTE — REVIEW OF SYSTEMS
[Dyspnea on Exertion] : no dyspnea on exertion [Chills] : no chills [Night Sweats] : no night sweats [Negative] : Neurological 5-Fu Counseling: 5-Fluorouracil Counseling:  I discussed with the patient the risks of 5-fluorouracil including but not limited to erythema, scaling, itching, weeping, crusting, and pain.

## 2022-03-02 ENCOUNTER — RESULT REVIEW (OUTPATIENT)
Age: 52
End: 2022-03-02

## 2022-03-02 ENCOUNTER — APPOINTMENT (OUTPATIENT)
Dept: GASTROENTEROLOGY | Facility: AMBULATORY MEDICAL SERVICES | Age: 52
End: 2022-03-02
Payer: COMMERCIAL

## 2022-03-02 PROCEDURE — 45380 COLONOSCOPY AND BIOPSY: CPT | Mod: 33

## 2022-06-27 ENCOUNTER — APPOINTMENT (OUTPATIENT)
Dept: FAMILY MEDICINE | Facility: CLINIC | Age: 52
End: 2022-06-27

## 2022-06-27 DIAGNOSIS — U07.1 COVID-19: ICD-10-CM

## 2022-06-27 PROCEDURE — 99213 OFFICE O/P EST LOW 20 MIN: CPT | Mod: 95

## 2022-06-27 NOTE — PLAN
[FreeTextEntry1] : COVID-19 VTEB for 51 year old WF with PMH as stated in HPI / active list. \par \par Management : \par \par See HPI and Plan.\par \par Supportive and conservative therapies reviewed and advised, to consider:\par Increase fluids and rest.\par Cool mist humidifier.\par Vicks vapor rub to the chest.\par Acetaminophen/Advil for fever, headache, myalgias.\par \par FU in 2-3 days if fails to improve or symptoms worsen. \par \par If still coughing after 4 days, +/- azithromycin for additional relief.\par \par Quarantine for 5 days.\par \par Zinc 200 mg daily and Vitamin C 500 mg daily suggested.\par \par Best wishes offered!

## 2022-06-27 NOTE — ADDENDUM
[FreeTextEntry1] : Medical record entries made by the scribe today, were at my direction and personally dictated to them by me, Dr. Najma Arroyo on 06/27/2022.  I have reviewed the chart and agree that the record accurately reflects my personal performance of the history, physical exam, assessment and plan.\par \par Bruce GARCÍA acting as scribe for Dr. Najma Arroyo MD on 06/27/2022 at 10:19 AM.\par

## 2022-07-02 PROBLEM — U07.1 COVID-19 VIRUS INFECTION: Status: ACTIVE | Noted: 2022-07-02

## 2022-07-07 NOTE — PHYSICAL EXAM
[No Acute Distress] : no acute distress [Normal Sclera/Conjunctiva] : normal sclera/conjunctiva [No JVD] : no jugular venous distention [No Respiratory Distress] : no respiratory distress  [No Accessory Muscle Use] : no accessory muscle use [No Focal Deficits] : no focal deficits [Speech Grossly Normal] : speech grossly normal [Alert and Oriented x3] : oriented to person, place, and time [de-identified] : engaging  appears non toxic  [de-identified] : HIPOLITO

## 2022-07-07 NOTE — HISTORY OF PRESENT ILLNESS
[FreeTextEntry1] : CARMEN ARCHER is a 51 year old F who presents virtually today following a POSITIVE COVID-19 result Sunday 6/27/22.  Pt states she began to feel run down and tired on Saturday 6/26/22, began to cough on Sunday then took home test. \par \par Afebrile temperature today 99 degrees.  Body aches, fatigue, sinus congestion. Pt states she has taken Advil and DayQuil/NyQuil for relief, appreciates relief.   [Home] : at home, [unfilled] , at the time of the visit. [Medical Office: ([facility])___] : at the medical office located in  [Verbal consent obtained from patient] : the patient, [unfilled] [FreeTextEntry8] : Last seen for CPE in December 2021 :\par SInce has been well until:\par \par Yesterday note fatigue, body aches, chills with temperature 99 ; dome sinus congestion .  DRY NP cough..  NO SOB \par No known sick contacts\par + home test today for COVID\par \par Some relief Advil cold and sinus and NyQuil \par \par

## 2022-07-07 NOTE — HISTORY OF PRESENT ILLNESS
[FreeTextEntry1] : CARMEN ARCHER is a 51 year old F who presents virtually today following a POSITIVE COVID-19 result Sunday 6/27/22.  Pt states she began to feel run down and tired on Saturday 6/26/22, began to cough on Sunday then took home test. \par \par Afebrile temperature today 99 degrees.  Body aches, fatigue, sinus congestion. Pt states she has taken Advil and DayQuil/NyQuil for relief, appreciates relief.   [Home] : at home, [unfilled] , at the time of the visit. [Medical Office: ([facility])___] : at the medical office located in  [Verbal consent obtained from patient] : the patient, [unfilled] [FreeTextEntry8] : Last seen for CPE in December 2021 :\par SInce has been well until:\par \par Yesterday note fatigue, body aches, chills with temperature 99 ; dome sinus congestion .  DRY NP cough..  NO SOB \par No known sick contacts\par + home test today for COVID\par \par Some relief Advil cold and sinus and NyQuil \par \par

## 2022-07-07 NOTE — ASSESSMENT
[FreeTextEntry1] : Healthy 51 year old female with no significant PMH now with COVID \par \par Discussed supportive therapies appropriate and anticipatory guidance as well as reviewed current CDC guidelines for Quarantine. \par \par She agrees to cb if not improving at day 4 . \par \par Best wishes! \par \par Time spent 12 minutes

## 2022-07-07 NOTE — PHYSICAL EXAM
S/w pt and gave Dr. Vaughn's message below.  Pt will start hold of warfarin tonight.  States Yoselyn was going to send the lovenox and warfarin dosing via a my chart message.    Pt states understanding.    Neeta GIPSON RN  EP Triage     [No Acute Distress] : no acute distress [Normal Sclera/Conjunctiva] : normal sclera/conjunctiva [No JVD] : no jugular venous distention [No Respiratory Distress] : no respiratory distress  [No Accessory Muscle Use] : no accessory muscle use [No Focal Deficits] : no focal deficits [Speech Grossly Normal] : speech grossly normal [Alert and Oriented x3] : oriented to person, place, and time [de-identified] : engaging  appears non toxic  [de-identified] : HIPOLITO

## 2023-02-06 ENCOUNTER — NON-APPOINTMENT (OUTPATIENT)
Age: 53
End: 2023-02-06

## 2023-03-12 ENCOUNTER — NON-APPOINTMENT (OUTPATIENT)
Age: 53
End: 2023-03-12

## 2023-03-23 ENCOUNTER — NON-APPOINTMENT (OUTPATIENT)
Age: 53
End: 2023-03-23

## 2023-05-16 ENCOUNTER — APPOINTMENT (OUTPATIENT)
Dept: FAMILY MEDICINE | Facility: CLINIC | Age: 53
End: 2023-05-16
Payer: COMMERCIAL

## 2023-05-16 VITALS
HEART RATE: 56 BPM | TEMPERATURE: 98 F | HEIGHT: 64 IN | BODY MASS INDEX: 21.34 KG/M2 | RESPIRATION RATE: 16 BRPM | OXYGEN SATURATION: 98 % | DIASTOLIC BLOOD PRESSURE: 80 MMHG | WEIGHT: 125 LBS | SYSTOLIC BLOOD PRESSURE: 122 MMHG

## 2023-05-16 DIAGNOSIS — Z78.0 ASYMPTOMATIC MENOPAUSAL STATE: ICD-10-CM

## 2023-05-16 PROCEDURE — 99214 OFFICE O/P EST MOD 30 MIN: CPT | Mod: 25

## 2023-05-16 PROCEDURE — 36415 COLL VENOUS BLD VENIPUNCTURE: CPT

## 2023-05-16 RX ORDER — SODIUM SULFATE, POTASSIUM SULFATE, MAGNESIUM SULFATE 17.5; 3.13; 1.6 G/ML; G/ML; G/ML
17.5-3.13-1.6 SOLUTION, CONCENTRATE ORAL
Qty: 1 | Refills: 0 | Status: COMPLETED | COMMUNITY
Start: 2022-02-08 | End: 2023-05-16

## 2023-05-16 RX ORDER — PAROXETINE HYDROCHLORIDE 10 MG/1
10 TABLET, FILM COATED ORAL DAILY
Qty: 30 | Refills: 1 | Status: ACTIVE | COMMUNITY
Start: 2023-05-16 | End: 1900-01-01

## 2023-05-16 RX ORDER — AZITHROMYCIN 250 MG/1
250 TABLET, FILM COATED ORAL
Qty: 1 | Refills: 0 | Status: COMPLETED | COMMUNITY
Start: 2022-07-07 | End: 2023-05-16

## 2023-05-19 NOTE — REVIEW OF SYSTEMS
[Hot Flashes] : hot flashes [Night Sweats] : night sweats [Abdominal Pain] : abdominal pain [Negative] : ENT [Anxiety] : anxiety [Fever] : no fever [Chills] : no chills [Nausea] : no nausea [Vomiting] : no vomiting [Heartburn] : no heartburn [Melena] : no melena [FreeTextEntry2] : see HPI  [FreeTextEntry7] : abdominal discomforts, see HPI  [de-identified] : a bit anxious today, see HPI

## 2023-05-19 NOTE — ASSESSMENT
[FreeTextEntry1] : Acute encounter for 52 year old WF with PMH as stated in HPI / active list. \par \par Management : \par \par See HPI and Plan.\par \par Labs in office today, will advise. \par \par US abdomen to be completed, order provided, follow up as needed. \par \par Paroxetine script provided, encouraged to use as instructed, follow up as needed. \par Consider ashwagandha as well in PM  . \par \par Mammogram requested and reviewed with pt in office today.  \par \par \par Best wishes offered!\par

## 2023-05-19 NOTE — ADDENDUM
[FreeTextEntry1] : Medical record entries made by the scribe today, were at my direction and personally dictated to them by me, Dr. Najma Arroyo on 05/16/2023.  I have reviewed the chart and agree that the record accurately reflects my personal performance of the history, physical exam, assessment and plan.\par \par Bruce GARCÍA acting as scribe for Dr. Najma Arroyo MD on 05/16/2023 at 1:30 PM.\par \par \par \par

## 2023-05-19 NOTE — PHYSICAL EXAM
[No Acute Distress] : no acute distress [Normal Sclera/Conjunctiva] : normal sclera/conjunctiva [No JVD] : no jugular venous distention [No Respiratory Distress] : no respiratory distress  [Normal Rate] : normal rate  [No Edema] : there was no peripheral edema [No Rash] : no rash [Speech Grossly Normal] : speech grossly normal [Alert and Oriented x3] : oriented to person, place, and time [Normal Mood] : the mood was normal [de-identified] : a bit tearful but calm and engaging

## 2023-05-19 NOTE — HISTORY OF PRESENT ILLNESS
[FreeTextEntry8] : pt has had tender breasts, pain in pelvis and groin and gas at night for a few weeks \par had mammo last week and was informed l breast is dense \par NKDA \par [facility]\par \par CARMEN ARCHER is a 52 year old F who presents today for acute perimenopausal symptoms onset in recent weeks.  Pt states b/l lower quadrant pain, tender breasts, night sweats, increased morning bloating and gas.  States increased work stress.  \par \par Continues to follow regularly with GYN, mammogram completed last week Dr. Dany Morel, left breast dense plans on following for US regarding.  \par \par Daughter now pregnant, congratulations offered.  \par \par

## 2023-05-22 LAB
ALBUMIN SERPL ELPH-MCNC: 4.5 G/DL
ALP BLD-CCNC: 40 U/L
ALT SERPL-CCNC: 17 U/L
ANION GAP SERPL CALC-SCNC: 9 MMOL/L
AST SERPL-CCNC: 22 U/L
BASOPHILS # BLD AUTO: 0.05 K/UL
BASOPHILS NFR BLD AUTO: 0.7 %
BILIRUB SERPL-MCNC: 0.4 MG/DL
BUN SERPL-MCNC: 14 MG/DL
CALCIUM SERPL-MCNC: 9.5 MG/DL
CHLORIDE SERPL-SCNC: 104 MMOL/L
CHOLEST SERPL-MCNC: 222 MG/DL
CO2 SERPL-SCNC: 27 MMOL/L
CREAT SERPL-MCNC: 0.93 MG/DL
EGFR: 73 ML/MIN/1.73M2
EOSINOPHIL # BLD AUTO: 0.04 K/UL
EOSINOPHIL NFR BLD AUTO: 0.5 %
ESTIMATED AVERAGE GLUCOSE: 94 MG/DL
GLUCOSE SERPL-MCNC: 85 MG/DL
HBA1C MFR BLD HPLC: 4.9 %
HCT VFR BLD CALC: 38.5 %
HDLC SERPL-MCNC: 89 MG/DL
HGB BLD-MCNC: 12.6 G/DL
IMM GRANULOCYTES NFR BLD AUTO: 0.3 %
LDLC SERPL CALC-MCNC: 124 MG/DL
LYMPHOCYTES # BLD AUTO: 1.78 K/UL
LYMPHOCYTES NFR BLD AUTO: 23.4 %
MAN DIFF?: NORMAL
MCHC RBC-ENTMCNC: 30.9 PG
MCHC RBC-ENTMCNC: 32.7 GM/DL
MCV RBC AUTO: 94.4 FL
MONOCYTES # BLD AUTO: 0.54 K/UL
MONOCYTES NFR BLD AUTO: 7.1 %
NEUTROPHILS # BLD AUTO: 5.17 K/UL
NEUTROPHILS NFR BLD AUTO: 68 %
NONHDLC SERPL-MCNC: 133 MG/DL
PLATELET # BLD AUTO: 226 K/UL
POTASSIUM SERPL-SCNC: 4.6 MMOL/L
PROT SERPL-MCNC: 6.4 G/DL
RBC # BLD: 4.08 M/UL
RBC # FLD: 12.7 %
SODIUM SERPL-SCNC: 140 MMOL/L
T4 SERPL-MCNC: 5.5 UG/DL
TRIGL SERPL-MCNC: 46 MG/DL
TSH SERPL-ACNC: 2.07 UIU/ML
WBC # FLD AUTO: 7.6 K/UL

## 2023-08-29 NOTE — PAST MEDICAL HISTORY
Positive [Menstruating] : hx of menstruating [Definite ___ (Date)] : the last menstrual period was [unfilled] [Total Preg ___] : G: [unfilled] [Live Births___] : P: [unfilled] [AB Spont ___] : miscarriage(s): [unfilled]

## 2023-10-05 ENCOUNTER — APPOINTMENT (OUTPATIENT)
Dept: ORTHOPEDIC SURGERY | Facility: CLINIC | Age: 53
End: 2023-10-05
Payer: COMMERCIAL

## 2023-10-05 ENCOUNTER — APPOINTMENT (OUTPATIENT)
Dept: FAMILY MEDICINE | Facility: CLINIC | Age: 53
End: 2023-10-05

## 2023-10-05 VITALS — BODY MASS INDEX: 21.34 KG/M2 | HEIGHT: 64 IN | WEIGHT: 125 LBS

## 2023-10-05 DIAGNOSIS — G89.29 PAIN IN LEFT HIP: ICD-10-CM

## 2023-10-05 DIAGNOSIS — M25.552 PAIN IN LEFT HIP: ICD-10-CM

## 2023-10-05 PROCEDURE — 99203 OFFICE O/P NEW LOW 30 MIN: CPT

## 2023-10-05 PROCEDURE — 73502 X-RAY EXAM HIP UNI 2-3 VIEWS: CPT

## 2023-10-16 ENCOUNTER — OUTPATIENT (OUTPATIENT)
Dept: OUTPATIENT SERVICES | Facility: HOSPITAL | Age: 53
LOS: 1 days | End: 2023-10-16
Payer: COMMERCIAL

## 2023-10-16 ENCOUNTER — APPOINTMENT (OUTPATIENT)
Dept: MRI IMAGING | Facility: CLINIC | Age: 53
End: 2023-10-16
Payer: COMMERCIAL

## 2023-10-16 DIAGNOSIS — M25.552 PAIN IN LEFT HIP: ICD-10-CM

## 2023-10-16 DIAGNOSIS — Z87.59 PERSONAL HISTORY OF OTHER COMPLICATIONS OF PREGNANCY, CHILDBIRTH AND THE PUERPERIUM: Chronic | ICD-10-CM

## 2023-10-16 PROCEDURE — 73721 MRI JNT OF LWR EXTRE W/O DYE: CPT

## 2023-10-16 PROCEDURE — 73721 MRI JNT OF LWR EXTRE W/O DYE: CPT | Mod: 26,LT

## 2023-10-24 ENCOUNTER — NON-APPOINTMENT (OUTPATIENT)
Age: 53
End: 2023-10-24

## 2024-01-18 ENCOUNTER — NON-APPOINTMENT (OUTPATIENT)
Age: 54
End: 2024-01-18

## 2024-01-23 DIAGNOSIS — R05.9 COUGH, UNSPECIFIED: ICD-10-CM

## 2024-01-23 RX ORDER — AZITHROMYCIN 250 MG/1
250 TABLET, FILM COATED ORAL
Qty: 1 | Refills: 0 | Status: ACTIVE | COMMUNITY
Start: 2024-01-23 | End: 1900-01-01

## 2024-03-21 ENCOUNTER — LABORATORY RESULT (OUTPATIENT)
Age: 54
End: 2024-03-21

## 2024-03-21 ENCOUNTER — APPOINTMENT (OUTPATIENT)
Dept: INFECTIOUS DISEASE | Facility: CLINIC | Age: 54
End: 2024-03-21
Payer: COMMERCIAL

## 2024-03-21 VITALS
BODY MASS INDEX: 20.6 KG/M2 | TEMPERATURE: 98.1 F | HEART RATE: 72 BPM | DIASTOLIC BLOOD PRESSURE: 71 MMHG | WEIGHT: 120 LBS | OXYGEN SATURATION: 98 % | SYSTOLIC BLOOD PRESSURE: 112 MMHG

## 2024-03-21 DIAGNOSIS — B69.0 CYSTICERCOSIS OF CENTRAL NERVOUS SYSTEM: ICD-10-CM

## 2024-03-21 PROCEDURE — 99204 OFFICE O/P NEW MOD 45 MIN: CPT

## 2024-03-21 RX ORDER — ALBENDAZOLE 200 MG/1
200 TABLET ORAL
Qty: 56 | Refills: 0 | Status: ACTIVE | COMMUNITY
Start: 2024-03-21 | End: 1900-01-01

## 2024-03-21 RX ORDER — PREDNISONE 20 MG/1
20 TABLET ORAL
Qty: 30 | Refills: 0 | Status: ACTIVE | COMMUNITY
Start: 2024-03-21 | End: 1900-01-01

## 2024-03-21 RX ORDER — PRAZIQUANTEL 600 MG/1
600 TABLET, FILM COATED ORAL EVERY 8 HOURS
Qty: 63 | Refills: 0 | Status: ACTIVE | COMMUNITY
Start: 2024-03-21 | End: 1900-01-01

## 2024-03-21 NOTE — ASSESSMENT
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  WIll do CBC, CMP and cysticercosis Ab today.   She has no neurological symptoms but since has multiple cysts needs to be treated with 2 medications (albendazole and praziquantel). Will start steroid one day prior to starting medication to decrease chance of seizure and edema in brain and then will taper rapidly. discussed S/E of meds.   Will treat her with 2 weeks of meds.  August follow with neurology.   Patient was given the opportunity to ask questions and all questions were answered to their satisfaction. Counseling included lab results, differential diagnosis, treatment options, risks and benefits, lifestyle changes, current condition, medications and dose adjustments. The patient verbalized understanding. [Treatment Education] : treatment education [Treatment Adherence] : treatment adherence [Rx Dose / Side Effects] : Rx dose/side effects [Risk Reduction] : risk reduction [Nutritional / Food Issues] : nutritional/food issues [Universal Precautions] : universal precautions [Drug Interactions / Side Effects] : drug interactions/side effects [Anticipatory Guidance] : anticipatory guidance

## 2024-03-21 NOTE — PHYSICAL EXAM
[General Appearance - Alert] : alert [Sclera] : the sclera and conjunctiva were normal [General Appearance - In No Acute Distress] : in no acute distress [PERRL With Normal Accommodation] : pupils were equal in size, round, reactive to light [Extraocular Movements] : extraocular movements were intact [Neck Appearance] : the appearance of the neck was normal [Neck Cervical Mass (___cm)] : no neck mass was observed [Jugular Venous Distention Increased] : there was no jugular-venous distention [Thyroid Diffuse Enlargement] : the thyroid was not enlarged [Auscultation Breath Sounds / Voice Sounds] : lungs were clear to auscultation bilaterally [Heart Rate And Rhythm] : heart rate was normal and rhythm regular [Heart Sounds] : normal S1 and S2 [Heart Sounds Gallop] : no gallops [Murmurs] : no murmurs [Heart Sounds Pericardial Friction Rub] : no pericardial rub [Full Pulse] : the pedal pulses are present [Edema] : there was no peripheral edema [Bowel Sounds] : normal bowel sounds [Abdomen Soft] : soft [Abdomen Tenderness] : non-tender [] : no hepato-splenomegaly [Abdomen Mass (___ Cm)] : no abdominal mass palpated [Costovertebral Angle Tenderness] : no CVA tenderness [No Palpable Adenopathy] : no palpable adenopathy [Musculoskeletal - Swelling] : no joint swelling [Nail Clubbing] : no clubbing  or cyanosis of the fingernails [Motor Tone] : muscle strength and tone were normal [Deep Tendon Reflexes (DTR)] : deep tendon reflexes were 2+ and symmetric [Sensation] : the sensory exam was normal to light touch and pinprick [No Focal Deficits] : no focal deficits [Oriented To Time, Place, And Person] : oriented to person, place, and time [Affect] : the affect was normal

## 2024-03-21 NOTE — HISTORY OF PRESENT ILLNESS
[FreeTextEntry1] : 54yo woman with no significant PMH was referred by a neurologist for multiple cysts found in Brain MRI.  Patient had some pain and indigestion symptoms for a while so had some work up done including CT with no  obvious abnormal findings. She had colonoscopy 2years ago that was reportedly normal.  Her physician decided to pan-scan her. CT of head showed few small cysts in brain for which had MRI and seen a neurologist.  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 She never had seizure or any neuro deficit.  She is originally from [facility] but moved to [facility] and then . She eats a [employer] dish made with smoked pork.  No smoking, ETOH or drugs.

## 2024-04-18 ENCOUNTER — APPOINTMENT (OUTPATIENT)
Dept: OBGYN | Facility: CLINIC | Age: 54
End: 2024-04-18
Payer: COMMERCIAL

## 2024-04-18 VITALS
HEIGHT: 64 IN | DIASTOLIC BLOOD PRESSURE: 67 MMHG | WEIGHT: 125 LBS | SYSTOLIC BLOOD PRESSURE: 108 MMHG | BODY MASS INDEX: 21.34 KG/M2

## 2024-04-18 DIAGNOSIS — Z01.419 ENCOUNTER FOR GYNECOLOGICAL EXAMINATION (GENERAL) (ROUTINE) W/OUT ABNORMAL FINDINGS: ICD-10-CM

## 2024-04-18 PROCEDURE — 99386 PREV VISIT NEW AGE 40-64: CPT

## 2024-04-18 PROCEDURE — 99459 PELVIC EXAMINATION: CPT

## 2024-04-18 NOTE — PLAN
[FreeTextEntry1] : Health Maintenance: 53 year old female pt presents for annual gyn exam BSE taught Reviewed diet and exercise Breast and pelvic exam performed Pap/HPV conducted Advised to schedule mammogram and breast sonogram, Rx given by Dr. Wagner Pt is UTD with colonoscopy  Pt is UTD with bone density Advised pt to see PCP annually  Abdominal discomfort Pt had an MRI that showed slightly thickened endometrium and IUD in place Pt will send recent pelvic sono to office  Frequency Advised to do pelvic floor exercises  Advised to reduce liquid intake before bedtime and caffeine intake  RTO in 1 year or PRN

## 2024-04-18 NOTE — PHYSICAL EXAM
[Chaperone Present] : A chaperone was present in the examining room during all aspects of the physical examination [[id number]] : A chaperone was present during the pelvic exam. [Appropriately responsive] : appropriately responsive [Alert] : alert [No Acute Distress] : no acute distress [No Lymphadenopathy] : no lymphadenopathy [Regular Rate Rhythm] : regular rate rhythm [No Murmurs] : no murmurs [Clear to Auscultation B/L] : clear to auscultation bilaterally [Soft] : soft [Non-distended] : non-distended [Non-tender] : non-tender [No HSM] : No HSM [No Lesions] : no lesions [No Mass] : no mass [Oriented x3] : oriented x3 [Examination Of The Breasts] : a normal appearance [No Masses] : no breast masses were palpable [Labia Majora] : normal [Labia Minora] : normal [Normal] : normal [Uterine Adnexae] : normal

## 2024-04-18 NOTE — HISTORY OF PRESENT ILLNESS
[FreeTextEntry1] : 2024. CARMEN ARCHER 53 years old female  LMP PM. She presents today to establish gynecologic care.  She had an ablation about 10 years ago and reports menses never came back. No Hx of STD, PID, or IUD use for contraception. No Hx of abnormal pap smear. No ovarian cyst, uterine fibroid, endometriosis, pelvic infection or infertility.   She has mild vasomotor sxs such as intermittent hot flashes and night sweats but are tolerable. She reports urinary frequency, otherwise not other urinary complaints. She denies abdominal and pelvic pain. No vaginal discharge or vaginitis symptoms. BM is normal per patient, no blood in stool or constipation/diarrhea.   Pt is sexually active with male partner. Denies sexual dysfunction.   She has been having ongoing abdominal discomfort and had a whole body MRI in [facility] on 2023 that showed benign vs pathologist findings were found. She has had a workup done by GI due to possible pancreatic cysts. She is also having a work up done by neurologist.   MRI showed 6mm endometrial lining and an IUD in place?, however she never remembers having one placed. She had a follow up pelvic sono 2 months ago that did not show an IUD and will send records to office.   She had a benign breast bx in early  and recurrent breast MRI of a right breast in 2023. She had her yearly mammogram/breast sonogram in 2023. She is followed by Dr. Wagner.   She sees PCP yearly.  Obhx:  GYNhx: denies PMH: Arthritis PSH: c/s (), Ablation (), benign breast bx () Med: none All: NKDA Soc: occ alc, occ marijuana use, non smoker Psych: denies Famhx: Sister: lymphoma 49 y/o.  [Patient reported mammogram was normal] : Patient reported mammogram was normal [Patient reported breast sonogram was normal] : Patient reported breast sonogram was normal [Patient reported bone density results were normal] : Patient reported bone density results were normal [Patient reported colonoscopy was normal] : Patient reported colonoscopy was normal [Mammogramdate] : 5/2023 [BreastSonogramDate] : 5/2023 [BoneDensityDate] : few years ago [ColonoscopyDate] : 3/2022

## 2024-05-02 LAB
CYTOLOGY CVX/VAG DOC THIN PREP: NORMAL
HPV HIGH+LOW RISK DNA PNL CVX: NOT DETECTED

## 2024-05-30 ENCOUNTER — LABORATORY RESULT (OUTPATIENT)
Age: 54
End: 2024-05-30

## 2024-05-30 ENCOUNTER — APPOINTMENT (OUTPATIENT)
Dept: FAMILY MEDICINE | Facility: CLINIC | Age: 54
End: 2024-05-30
Payer: COMMERCIAL

## 2024-05-30 VITALS
OXYGEN SATURATION: 99 % | WEIGHT: 124 LBS | SYSTOLIC BLOOD PRESSURE: 108 MMHG | HEART RATE: 69 BPM | DIASTOLIC BLOOD PRESSURE: 64 MMHG | BODY MASS INDEX: 21.17 KG/M2 | HEIGHT: 64 IN

## 2024-05-30 DIAGNOSIS — Z00.00 ENCOUNTER FOR GENERAL ADULT MEDICAL EXAMINATION W/OUT ABNORMAL FINDINGS: ICD-10-CM

## 2024-05-30 DIAGNOSIS — R14.0 ABDOMINAL DISTENSION (GASEOUS): ICD-10-CM

## 2024-05-30 PROCEDURE — 99396 PREV VISIT EST AGE 40-64: CPT

## 2024-05-31 NOTE — HEALTH RISK ASSESSMENT
[Very Good] : ~his/her~  mood as very good [Yes] : Yes [2 - 4 times a month (2 pts)] : 2-4 times a month (2 points) [1 or 2 (0 pts)] : 1 or 2 (0 points) [Never (0 pts)] : Never (0 points) [No falls in past year] : Patient reported no falls in the past year [0] : 2) Feeling down, depressed, or hopeless: Not at all (0) [PHQ-2 Negative - No further assessment needed] : PHQ-2 Negative - No further assessment needed [Never] : Never [Patient reported PAP Smear was normal] : Patient reported PAP Smear was normal [Patient reported colonoscopy was normal] : Patient reported colonoscopy was normal [None] : None [Alone] : lives alone [Employed] : employed [College] : College [] :  [# Of Children ___] : has [unfilled] children [Feels Safe at Home] : Feels safe at home [Fully functional (bathing, dressing, toileting, transferring, walking, feeding)] : Fully functional (bathing, dressing, toileting, transferring, walking, feeding) [Fully functional (using the telephone, shopping, preparing meals, housekeeping, doing laundry, using] : Fully functional and needs no help or supervision to perform IADLs (using the telephone, shopping, preparing meals, housekeeping, doing laundry, using transportation, managing medications and managing finances) [FreeTextEntry1] : as in hpi  [de-identified] : as in hpi  [de-identified] : running  [de-identified] : "careful"  shantel  [GOO1Iflup] : 0 [Change in mental status noted] : No change in mental status noted [MammogramDate] : 5/23 [MammogramComments] : as in hpi  [PapSmearDate] : 4/24 [ColonoscopyDate] : 2022 [ColonoscopyComments] : FU in 5 years

## 2024-05-31 NOTE — ASSESSMENT
[FreeTextEntry1] : Annual exam for 53 year old female with PMH as stated in HPI / active list.   Management :   See HPI and Plan  Labs in office today.   Will advise.  Will fax to Neurology when reviewed.   Medications reconciled.   Best wishes offered !

## 2024-05-31 NOTE — REVIEW OF SYSTEMS
[Eyesight Problems] : eyesight problems [see HPI] : see HPI [Negative] : Neurological [Fever] : no fever [Chills] : no chills [Feeling Poorly] : not feeling poorly [Anxiety] : no anxiety [Depression] : no depression

## 2024-05-31 NOTE — PHYSICAL EXAM
[General Appearance - Alert] : alert [General Appearance - Well-Appearing] : healthy appearing [Sclera] : the sclera and conjunctiva were normal [PERRL With Normal Accommodation] : pupils were equal in size, round, and reactive to light [Extraocular Movements] : extraocular movements were intact [Thyroid Diffuse Enlargement] : the thyroid was not enlarged [Respiration, Rhythm And Depth] : normal respiratory rhythm and effort [Auscultation Breath Sounds / Voice Sounds] : lungs were clear to auscultation bilaterally [Heart Rate And Rhythm] : heart rate was normal and rhythm regular [Heart Sounds] : normal S1 and S2 [Murmurs] : no murmurs [Edema] : there was no peripheral edema [Bowel Sounds] : normal bowel sounds [Abdomen Soft] : soft [Abdomen Mass (___ Cm)] : no abdominal mass palpated [Cervical Lymph Nodes Enlarged Posterior Bilaterally] : posterior cervical [Cervical Lymph Nodes Enlarged Anterior Bilaterally] : anterior cervical [Supraclavicular Lymph Nodes Enlarged Bilaterally] : supraclavicular [No CVA Tenderness] : no ~M costovertebral angle tenderness [No Spinal Tenderness] : no spinal tenderness [Abnormal Walk] : normal gait [Involuntary Movements] : no involuntary movements were seen [Skin Turgor] : normal skin turgor [] : no rash [Deep Tendon Reflexes (DTR)] : deep tendon reflexes were 2+ and symmetric [No Focal Deficits] : no focal deficits [Oriented To Time, Place, And Person] : oriented to person, place, and time [FreeTextEntry1] : is considering reading glasses

## 2024-05-31 NOTE — HISTORY OF PRESENT ILLNESS
[FreeTextEntry1] : CPE Last CPE was in December 2021. [de-identified] : Last seen for acute visit in May 2023. Several consultations for evaluation of abdominal pain and MRI findings of IPMN's in Pancreas not evident on imaging including contrast. Further MARIA with GI/Hem/OCN and Neurology and referred to ID for concerns of brain lesions suspicious for neurocysticercosis which were treated by ID with Albendazole and Praziquantel and Prednisone in March and Audrey endorses she completed therapy and tolerated drug regime without complaints. Neurologist advised some blood work-up at CPE visit today.  GYN exams in April with new provider was endorses normal exam. She endorses there is no evidence on imaging of IUD in place ( there was some recall of it being removed a few years ago).  Hem/ONC FU in 6 months.(October 2024). Reports abnormal Mammo May 2023 of left breast and she did have benign breast bx.  Has 6 month FU as well.   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 Congratulated!                Anticipating travel to [place] over summer ( country of Origin).

## 2024-06-07 LAB
25(OH)D3 SERPL-MCNC: 34.3 NG/ML
ALBUMIN SERPL ELPH-MCNC: 4.6 G/DL
ALP BLD-CCNC: 55 U/L
ALT SERPL-CCNC: 11 U/L
ANION GAP SERPL CALC-SCNC: 12 MMOL/L
APPEARANCE: CLEAR
AST SERPL-CCNC: 19 U/L
BASOPHILS # BLD AUTO: 0.05 K/UL
BASOPHILS NFR BLD AUTO: 0.9 %
BILIRUB SERPL-MCNC: 0.4 MG/DL
BILIRUBIN URINE: NEGATIVE
BLOOD URINE: NEGATIVE
BUN SERPL-MCNC: 14 MG/DL
CALCIUM SERPL-MCNC: 9.3 MG/DL
CHLORIDE SERPL-SCNC: 104 MMOL/L
CHOLEST SERPL-MCNC: 239 MG/DL
CO2 SERPL-SCNC: 24 MMOL/L
COLOR: YELLOW
CREAT SERPL-MCNC: 0.66 MG/DL
EGFR: 104 ML/MIN/1.73M2
EOSINOPHIL # BLD AUTO: 0.04 K/UL
EOSINOPHIL NFR BLD AUTO: 0.7 %
ESTIMATED AVERAGE GLUCOSE: 100 MG/DL
GLUCOSE QUALITATIVE U: NEGATIVE MG/DL
GLUCOSE SERPL-MCNC: 83 MG/DL
HBA1C MFR BLD HPLC: 5.1 %
HCT VFR BLD CALC: 44 %
HDLC SERPL-MCNC: 89 MG/DL
HGB BLD-MCNC: 13.9 G/DL
IMM GRANULOCYTES NFR BLD AUTO: 0.2 %
KETONES URINE: NEGATIVE MG/DL
LDLC SERPL CALC-MCNC: 140 MG/DL
LEUKOCYTE ESTERASE URINE: NEGATIVE
LYMPHOCYTES # BLD AUTO: 1.68 K/UL
LYMPHOCYTES NFR BLD AUTO: 30.8 %
MAN DIFF?: NORMAL
MCHC RBC-ENTMCNC: 31 PG
MCHC RBC-ENTMCNC: 31.6 GM/DL
MCV RBC AUTO: 98.2 FL
MONOCYTES # BLD AUTO: 0.5 K/UL
MONOCYTES NFR BLD AUTO: 9.2 %
NEUTROPHILS # BLD AUTO: 3.17 K/UL
NEUTROPHILS NFR BLD AUTO: 58.2 %
NITRITE URINE: NEGATIVE
NONHDLC SERPL-MCNC: 150 MG/DL
PH URINE: 6
PLATELET # BLD AUTO: 283 K/UL
POTASSIUM SERPL-SCNC: 4.9 MMOL/L
PROT SERPL-MCNC: 6.7 G/DL
PROTEIN URINE: NORMAL MG/DL
RBC # BLD: 4.48 M/UL
RBC # FLD: 13.2 %
RPR SER-TITR: NORMAL
SODIUM SERPL-SCNC: 140 MMOL/L
SPECIFIC GRAVITY URINE: 1.02
THYROGLOB AB SERPL-ACNC: <20 IU/ML
THYROGLOB SERPL-MCNC: 25.4 NG/ML
THYROPEROXIDASE AB SERPL IA-ACNC: <10 IU/ML
TRIGL SERPL-MCNC: 59 MG/DL
TSH SERPL-ACNC: 2.93 UIU/ML
UROBILINOGEN URINE: 0.2 MG/DL
WBC # FLD AUTO: 5.45 K/UL

## 2024-06-14 ENCOUNTER — LABORATORY RESULT (OUTPATIENT)
Age: 54
End: 2024-06-14

## 2024-08-22 ENCOUNTER — NON-APPOINTMENT (OUTPATIENT)
Age: 54
End: 2024-08-22

## 2024-08-26 ENCOUNTER — APPOINTMENT (OUTPATIENT)
Dept: OBGYN | Facility: CLINIC | Age: 54
End: 2024-08-26
Payer: COMMERCIAL

## 2024-08-26 ENCOUNTER — ASOB RESULT (OUTPATIENT)
Age: 54
End: 2024-08-26

## 2024-08-26 PROCEDURE — 76830 TRANSVAGINAL US NON-OB: CPT

## 2024-08-26 PROCEDURE — 99213 OFFICE O/P EST LOW 20 MIN: CPT

## 2024-08-26 NOTE — HISTORY OF PRESENT ILLNESS
[FreeTextEntry1] : 54 yo  pt with h/o AUB s/p endometrial ablation >10 years ago and now with chronic pelvic pain s/p MRI at another facility recently showing IUD in situ - pt does not recall ever having an IUD placed and/or removed. Outpt sono reports no IUD and pt now presents for f/u sono in office.  Pt does not report bleeding but daily pelvic pain 4/10 today and concerned as it is increasing in intensity.  Deep dive of chart shows: -sonogram in [facility] in 2017 reporting IUD in situ 2016 urgent care visit note pt reports IUD for contraception  8/26/2024 sono: Uterus heterogenous in appearance with calcifications noted One discrete intramural fibroid 1.5cm measured. thin endometrium 3.04. bilateral ovaries seen with simple cysts

## 2024-08-26 NOTE — PLAN
[FreeTextEntry1] : chronic pelvic pelvic pain with ?IUD in situ  - options discussed with patient: SHG, hysteroscopy vs xray  will discuss options with Dr Grewal for next steps  ENZO Larkin

## 2024-08-30 ENCOUNTER — NON-APPOINTMENT (OUTPATIENT)
Age: 54
End: 2024-08-30

## 2024-09-01 ENCOUNTER — EMERGENCY (EMERGENCY)
Facility: HOSPITAL | Age: 54
LOS: 0 days | Discharge: HOME | End: 2024-09-01
Attending: EMERGENCY MEDICINE
Payer: COMMERCIAL

## 2024-09-01 VITALS
RESPIRATION RATE: 18 BRPM | TEMPERATURE: 98 F | DIASTOLIC BLOOD PRESSURE: 80 MMHG | OXYGEN SATURATION: 100 % | SYSTOLIC BLOOD PRESSURE: 115 MMHG | HEART RATE: 68 BPM

## 2024-09-01 VITALS — WEIGHT: 124.56 LBS

## 2024-09-01 DIAGNOSIS — R10.30 LOWER ABDOMINAL PAIN, UNSPECIFIED: ICD-10-CM

## 2024-09-01 DIAGNOSIS — R19.7 DIARRHEA, UNSPECIFIED: ICD-10-CM

## 2024-09-01 DIAGNOSIS — R35.1 NOCTURIA: ICD-10-CM

## 2024-09-01 DIAGNOSIS — Z87.59 PERSONAL HISTORY OF OTHER COMPLICATIONS OF PREGNANCY, CHILDBIRTH AND THE PUERPERIUM: Chronic | ICD-10-CM

## 2024-09-01 DIAGNOSIS — Z97.5 PRESENCE OF (INTRAUTERINE) CONTRACEPTIVE DEVICE: ICD-10-CM

## 2024-09-01 DIAGNOSIS — R35.0 FREQUENCY OF MICTURITION: ICD-10-CM

## 2024-09-01 DIAGNOSIS — R10.2 PELVIC AND PERINEAL PAIN: ICD-10-CM

## 2024-09-01 LAB
ALBUMIN SERPL ELPH-MCNC: 3.9 G/DL — SIGNIFICANT CHANGE UP (ref 3.3–5)
ALP SERPL-CCNC: 43 U/L — SIGNIFICANT CHANGE UP (ref 40–120)
ALT FLD-CCNC: 22 U/L — SIGNIFICANT CHANGE UP (ref 12–78)
ANION GAP SERPL CALC-SCNC: 2 MMOL/L — LOW (ref 5–17)
APPEARANCE UR: CLEAR — SIGNIFICANT CHANGE UP
AST SERPL-CCNC: 20 U/L — SIGNIFICANT CHANGE UP (ref 15–37)
BASOPHILS # BLD AUTO: 0.05 K/UL — SIGNIFICANT CHANGE UP (ref 0–0.2)
BASOPHILS NFR BLD AUTO: 1 % — SIGNIFICANT CHANGE UP (ref 0–2)
BILIRUB SERPL-MCNC: 0.5 MG/DL — SIGNIFICANT CHANGE UP (ref 0.2–1.2)
BILIRUB UR-MCNC: NEGATIVE — SIGNIFICANT CHANGE UP
BUN SERPL-MCNC: 15 MG/DL — SIGNIFICANT CHANGE UP (ref 7–23)
CALCIUM SERPL-MCNC: 9.2 MG/DL — SIGNIFICANT CHANGE UP (ref 8.5–10.1)
CHLORIDE SERPL-SCNC: 108 MMOL/L — SIGNIFICANT CHANGE UP (ref 96–108)
CO2 SERPL-SCNC: 28 MMOL/L — SIGNIFICANT CHANGE UP (ref 22–31)
COLOR SPEC: YELLOW — SIGNIFICANT CHANGE UP
CREAT SERPL-MCNC: 0.65 MG/DL — SIGNIFICANT CHANGE UP (ref 0.5–1.3)
DIFF PNL FLD: NEGATIVE — SIGNIFICANT CHANGE UP
EGFR: 104 ML/MIN/1.73M2 — SIGNIFICANT CHANGE UP
EOSINOPHIL # BLD AUTO: 0.05 K/UL — SIGNIFICANT CHANGE UP (ref 0–0.5)
EOSINOPHIL NFR BLD AUTO: 1 % — SIGNIFICANT CHANGE UP (ref 0–6)
GLUCOSE SERPL-MCNC: 88 MG/DL — SIGNIFICANT CHANGE UP (ref 70–99)
GLUCOSE UR QL: NEGATIVE MG/DL — SIGNIFICANT CHANGE UP
HCG SERPL-ACNC: 1 MIU/ML — SIGNIFICANT CHANGE UP
HCT VFR BLD CALC: 40.8 % — SIGNIFICANT CHANGE UP (ref 34.5–45)
HGB BLD-MCNC: 13.7 G/DL — SIGNIFICANT CHANGE UP (ref 11.5–15.5)
IMM GRANULOCYTES NFR BLD AUTO: 0.2 % — SIGNIFICANT CHANGE UP (ref 0–0.9)
KETONES UR-MCNC: NEGATIVE MG/DL — SIGNIFICANT CHANGE UP
LEUKOCYTE ESTERASE UR-ACNC: NEGATIVE — SIGNIFICANT CHANGE UP
LIDOCAIN IGE QN: 26 U/L — SIGNIFICANT CHANGE UP (ref 13–75)
LYMPHOCYTES # BLD AUTO: 1.99 K/UL — SIGNIFICANT CHANGE UP (ref 1–3.3)
LYMPHOCYTES # BLD AUTO: 41.3 % — SIGNIFICANT CHANGE UP (ref 13–44)
MCHC RBC-ENTMCNC: 30.6 PG — SIGNIFICANT CHANGE UP (ref 27–34)
MCHC RBC-ENTMCNC: 33.6 GM/DL — SIGNIFICANT CHANGE UP (ref 32–36)
MCV RBC AUTO: 91.1 FL — SIGNIFICANT CHANGE UP (ref 80–100)
MONOCYTES # BLD AUTO: 0.47 K/UL — SIGNIFICANT CHANGE UP (ref 0–0.9)
MONOCYTES NFR BLD AUTO: 9.8 % — SIGNIFICANT CHANGE UP (ref 2–14)
NEUTROPHILS # BLD AUTO: 2.25 K/UL — SIGNIFICANT CHANGE UP (ref 1.8–7.4)
NEUTROPHILS NFR BLD AUTO: 46.7 % — SIGNIFICANT CHANGE UP (ref 43–77)
NITRITE UR-MCNC: NEGATIVE — SIGNIFICANT CHANGE UP
PH UR: 7 — SIGNIFICANT CHANGE UP (ref 5–8)
PLATELET # BLD AUTO: 220 K/UL — SIGNIFICANT CHANGE UP (ref 150–400)
POTASSIUM SERPL-MCNC: 4 MMOL/L — SIGNIFICANT CHANGE UP (ref 3.5–5.3)
POTASSIUM SERPL-SCNC: 4 MMOL/L — SIGNIFICANT CHANGE UP (ref 3.5–5.3)
PROT SERPL-MCNC: 7 GM/DL — SIGNIFICANT CHANGE UP (ref 6–8.3)
PROT UR-MCNC: NEGATIVE MG/DL — SIGNIFICANT CHANGE UP
RBC # BLD: 4.48 M/UL — SIGNIFICANT CHANGE UP (ref 3.8–5.2)
RBC # FLD: 12 % — SIGNIFICANT CHANGE UP (ref 10.3–14.5)
SODIUM SERPL-SCNC: 138 MMOL/L — SIGNIFICANT CHANGE UP (ref 135–145)
SP GR SPEC: 1.02 — SIGNIFICANT CHANGE UP (ref 1–1.03)
UROBILINOGEN FLD QL: 1 MG/DL — SIGNIFICANT CHANGE UP (ref 0.2–1)
WBC # BLD: 4.82 K/UL — SIGNIFICANT CHANGE UP (ref 3.8–10.5)
WBC # FLD AUTO: 4.82 K/UL — SIGNIFICANT CHANGE UP (ref 3.8–10.5)

## 2024-09-01 PROCEDURE — 74177 CT ABD & PELVIS W/CONTRAST: CPT | Mod: MC

## 2024-09-01 PROCEDURE — 96374 THER/PROPH/DIAG INJ IV PUSH: CPT | Mod: XU

## 2024-09-01 PROCEDURE — 85025 COMPLETE CBC W/AUTO DIFF WBC: CPT

## 2024-09-01 PROCEDURE — 99285 EMERGENCY DEPT VISIT HI MDM: CPT

## 2024-09-01 PROCEDURE — 99284 EMERGENCY DEPT VISIT MOD MDM: CPT | Mod: 25

## 2024-09-01 PROCEDURE — 80053 COMPREHEN METABOLIC PANEL: CPT

## 2024-09-01 PROCEDURE — 84702 CHORIONIC GONADOTROPIN TEST: CPT

## 2024-09-01 PROCEDURE — 83690 ASSAY OF LIPASE: CPT

## 2024-09-01 PROCEDURE — 81003 URINALYSIS AUTO W/O SCOPE: CPT

## 2024-09-01 PROCEDURE — 36415 COLL VENOUS BLD VENIPUNCTURE: CPT

## 2024-09-01 PROCEDURE — 74177 CT ABD & PELVIS W/CONTRAST: CPT | Mod: 26,MC

## 2024-09-01 RX ORDER — SODIUM CHLORIDE 9 MG/ML
1000 INJECTION INTRAMUSCULAR; INTRAVENOUS; SUBCUTANEOUS ONCE
Refills: 0 | Status: COMPLETED | OUTPATIENT
Start: 2024-09-01 | End: 2024-09-01

## 2024-09-01 RX ORDER — ACETAMINOPHEN 325 MG/1
1000 TABLET ORAL ONCE
Refills: 0 | Status: COMPLETED | OUTPATIENT
Start: 2024-09-01 | End: 2024-09-01

## 2024-09-01 RX ADMIN — SODIUM CHLORIDE 1000 MILLILITER(S): 9 INJECTION INTRAMUSCULAR; INTRAVENOUS; SUBCUTANEOUS at 11:04

## 2024-09-01 RX ADMIN — ACETAMINOPHEN 400 MILLIGRAM(S): 325 TABLET ORAL at 11:04

## 2024-09-01 NOTE — ED STATDOCS - PATIENT PORTAL LINK FT
You can access the FollowMyHealth Patient Portal offered by [facility] by registering at the following website: [email or web address]. By joining [employer]’s FollowMyHealth portal, you will also be able to view your health information using other applications (apps) compatible with our system.

## 2024-09-01 NOTE — ED STATDOCS - OBJECTIVE STATEMENT
52 y/o female with a PMHx of menorrhagia presenting to the ED c/o persistent lower abdominal pain x1 yr, with it now worsening today. Pt states "I have been having this pain for a long time. I think I have an old IUD that was never taken out. I had an MRI which showed I did have one in there but then they couldn't find it by Ultrasound. I am not sure if this is the pain I am currently having but it is really bad right now, I also feel like I have to pee all the time." Pt endorses urinary frequency, nocturia x3-4, with occasional diarrhea. Pt denies nausea, vomiting, fevers and chills. Pt has had extensive testing and imaging done, and knows that the IUD was placed in 2015 (unsure if copper). Pt has no known medical allergies.

## 2024-09-01 NOTE — ED STATDOCS - PROGRESS NOTE DETAILS
53-year-old female presents emergency department complaining of persistent pelvic pain for the past year which worsened this morning.  Patient has had an MRI performed 1 year ago demonstrating an IUD and since has had pelvic sonograms with no IUD found.  Patient has no recollection of when exactly IUD was inserted or if it was removed but states likely it has been there since at least 2015.  Patient has seen gastroenterologists and is seeing a new GYN, they are supposed to call her on Tuesday to schedule an exploratory procedure.  Patient denies ever having CT done.  Patient denies fevers, chills, chest pain, shortness of breath, nausea, vomiting, dysuria.  Endorses some diarrhea and urinary frequency.  No other complaints.  Vitals are stable.  On exam patient tearful, otherwise unremarkable, abdomen benign.  Plan for labs, CT abdomen pelvis, urinalysis, symptom control, reassess.  If negative workup will discharge with GYN follow-up as scheduled. - Huong Queen PA-C 53-year-old female presents emergency department complaining of persistent pelvic pain for the past year which worsened this morning.  Patient has had an MRI performed 1 year ago demonstrating an IUD and since has had pelvic sonograms with no IUD found.  Patient has no recollection of when exactly IUD was inserted or if it was removed but states likely it has been there since at least 2015.  Patient has seen gastroenterologists and saw GYN on 8/30, they are supposed to call her this week to schedule a hysteroscopy to evaluate for a retained IUD.  Patient denies ever having CT done.  Patient denies fevers, chills, chest pain, shortness of breath, nausea, vomiting, dysuria.  Endorses some diarrhea and urinary frequency.  No other complaints.  Vitals are stable.  On exam patient tearful, otherwise unremarkable, abdomen benign.  Plan for labs, CT abdomen pelvis, urinalysis, symptom control, reassess.  If negative workup will discharge with GYN follow-up as scheduled. - Huong Queen PA-C Labs and imaging reviewed.  Labs are within normal limits.  H/H stable.  No leukocytosis.  UA negative.  CT scan negative for acute intra-abdominal/pelvic pathology.  I spoke to OB/GYN resident Mercedes, agrees that as patient is stable and no vaginal bleeding at this time no GYN intervention is warranted as patient has OB/GYN follow-up this week to schedule for hysteroscopy outpatient.  Discussed all results with patient.  Pain controlled at this time.  Stable for discharge home with OB/GYN follow-up.  Strict return precautions to the ED were discussed.  Patient verbally expresses understanding and agrees to plan.  All questions and concerns were addressed at this time. - Huong Queen PA-C

## 2024-09-01 NOTE — ED STATDOCS - CLINICAL SUMMARY MEDICAL DECISION MAKING FREE TEXT BOX
Pt has had abdominal pain for over year, seen GYN, GI and primary. Pt has gotten numerous ultrasound and a full body MRI with no obvious cause. Exam was unremarkable. Plan for lab work, Ct-scan, and reassess.

## 2024-09-01 NOTE — ED ADULT TRIAGE NOTE - CHIEF COMPLAINT QUOTE
Pt presents to ED c/o lower abdominal pain x1 yr, worse today. Pt states "I have been having this pain for a long time. I think I have an old IUD that was never taken out. I had an MRI which showed I did have one in there but then they couldn't find it by Ultrasound. I am not sure if this is the pain I am currently having but it is really bad right now, I also feel like I have to pee all the time." Denies nausea, vomiting, diarrhea, fevers and chills.

## 2024-09-01 NOTE — ED ADULT NURSE NOTE - NSFALLUNIVINTERV_ED_ALL_ED
Bed/Stretcher in lowest position, wheels locked, appropriate side rails in place/Call bell, personal items and telephone in reach/Instruct patient to call for assistance before getting out of bed/chair/stretcher/Non-slip footwear applied when patient is off stretcher/[place] to call system/Physically safe environment - no spills, clutter or unnecessary equipment/Purposeful proactive rounding/Room/bathroom lighting operational, light cord in reach

## 2024-09-01 NOTE — ED ADULT NURSE NOTE - OBJECTIVE STATEMENT
Pt presents to ED c/o lower abd pain on and off for about one year, progressively becoming worse. pt reports she has seen multiple doctors but no one could find root cause for the pain. pt then had a full body MRI where they found an IUD. denies vaginal bleeding, fevers

## 2024-09-01 NOTE — ED STATDOCS - ATTENDING APP SHARED VISIT CONTRIBUTION OF CARE
I,Miguel Palacios MD,  performed the initial face to face bedside interview with this patient regarding history of present illness, review of symptoms and relevant past medical, social and family history.  I completed an independent physical examination.  I was the initial provider who evaluated this patient. I have signed out the follow up of any pending tests (i.e. labs, radiological studies) to the ACP.  I have communicated the patient’s plan of care and disposition with the ACP.  The history, relevant review of systems, past medical and surgical history, medical decision making, and physical examination was documented by the scribe in my presence and I attest to the accuracy of the documentation.

## 2024-09-11 ENCOUNTER — APPOINTMENT (OUTPATIENT)
Dept: OBGYN | Facility: CLINIC | Age: 54
End: 2024-09-11

## 2024-09-11 ENCOUNTER — OUTPATIENT (OUTPATIENT)
Dept: OUTPATIENT SERVICES | Facility: HOSPITAL | Age: 54
LOS: 1 days | End: 2024-09-11
Payer: COMMERCIAL

## 2024-09-11 VITALS
DIASTOLIC BLOOD PRESSURE: 69 MMHG | HEIGHT: 64 IN | RESPIRATION RATE: 18 BRPM | OXYGEN SATURATION: 97 % | TEMPERATURE: 98 F | HEART RATE: 68 BPM | WEIGHT: 121.92 LBS | SYSTOLIC BLOOD PRESSURE: 102 MMHG

## 2024-09-11 DIAGNOSIS — Z87.59 PERSONAL HISTORY OF OTHER COMPLICATIONS OF PREGNANCY, CHILDBIRTH AND THE PUERPERIUM: Chronic | ICD-10-CM

## 2024-09-11 DIAGNOSIS — Z97.5 PRESENCE OF (INTRAUTERINE) CONTRACEPTIVE DEVICE: ICD-10-CM

## 2024-09-11 DIAGNOSIS — T83.39XA OTHER MECHANICAL COMPLICATION OF INTRAUTERINE CONTRACEPTIVE DEVICE, INITIAL ENCOUNTER: ICD-10-CM

## 2024-09-11 DIAGNOSIS — Z01.818 ENCOUNTER FOR OTHER PREPROCEDURAL EXAMINATION: ICD-10-CM

## 2024-09-11 LAB
ANION GAP SERPL CALC-SCNC: 10 MMOL/L — SIGNIFICANT CHANGE UP (ref 5–17)
BUN SERPL-MCNC: 26 MG/DL — HIGH (ref 7–23)
CALCIUM SERPL-MCNC: 9.1 MG/DL — SIGNIFICANT CHANGE UP (ref 8.4–10.5)
CHLORIDE SERPL-SCNC: 103 MMOL/L — SIGNIFICANT CHANGE UP (ref 96–108)
CO2 SERPL-SCNC: 23 MMOL/L — SIGNIFICANT CHANGE UP (ref 22–31)
CREAT SERPL-MCNC: 0.86 MG/DL — SIGNIFICANT CHANGE UP (ref 0.5–1.3)
EGFR: 80 ML/MIN/1.73M2 — SIGNIFICANT CHANGE UP
GLUCOSE SERPL-MCNC: 96 MG/DL — SIGNIFICANT CHANGE UP (ref 70–99)
HCT VFR BLD CALC: 37.8 % — SIGNIFICANT CHANGE UP (ref 34.5–45)
HGB BLD-MCNC: 12.5 G/DL — SIGNIFICANT CHANGE UP (ref 11.5–15.5)
MCHC RBC-ENTMCNC: 30.7 PG — SIGNIFICANT CHANGE UP (ref 27–34)
MCHC RBC-ENTMCNC: 33.1 GM/DL — SIGNIFICANT CHANGE UP (ref 32–36)
MCV RBC AUTO: 92.9 FL — SIGNIFICANT CHANGE UP (ref 80–100)
NRBC # BLD: 0 /100 WBCS — SIGNIFICANT CHANGE UP (ref 0–0)
PLATELET # BLD AUTO: 230 K/UL — SIGNIFICANT CHANGE UP (ref 150–400)
POTASSIUM SERPL-MCNC: 4.2 MMOL/L — SIGNIFICANT CHANGE UP (ref 3.5–5.3)
POTASSIUM SERPL-SCNC: 4.2 MMOL/L — SIGNIFICANT CHANGE UP (ref 3.5–5.3)
RBC # BLD: 4.07 M/UL — SIGNIFICANT CHANGE UP (ref 3.8–5.2)
RBC # FLD: 12 % — SIGNIFICANT CHANGE UP (ref 10.3–14.5)
SODIUM SERPL-SCNC: 136 MMOL/L — SIGNIFICANT CHANGE UP (ref 135–145)
WBC # BLD: 7.72 K/UL — SIGNIFICANT CHANGE UP (ref 3.8–10.5)
WBC # FLD AUTO: 7.72 K/UL — SIGNIFICANT CHANGE UP (ref 3.8–10.5)

## 2024-09-11 PROCEDURE — 80048 BASIC METABOLIC PNL TOTAL CA: CPT

## 2024-09-11 PROCEDURE — G0463: CPT

## 2024-09-11 PROCEDURE — 99204 OFFICE O/P NEW MOD 45 MIN: CPT

## 2024-09-11 PROCEDURE — 85027 COMPLETE CBC AUTOMATED: CPT

## 2024-09-11 RX ORDER — MISOPROSTOL 200 UG/1
200 TABLET ORAL
Qty: 2 | Refills: 0 | Status: ACTIVE | COMMUNITY
Start: 2024-09-11 | End: 1900-01-01

## 2024-09-11 NOTE — REASON FOR VISIT
[Follow-Up] : a follow-up evaluation of [Other Location: e.g. School (Enter Location, City,State)___] : at [unfilled], at the time of the visit. [Medical Office: ([facility])___] : at the medical office located in  [Patient] : the patient

## 2024-09-11 NOTE — REASON FOR VISIT
[Follow-Up] : a follow-up evaluation of [Other Location: e.g. School (Enter Location, City,State)___] : at [unfilled], at the time of the visit. [Medical Office: ([facility])___] : at the medical office located in  [Patient] : the patient

## 2024-09-11 NOTE — HISTORY OF PRESENT ILLNESS
[FreeTextEntry1] : 53 yr old hx of endometrial ablation and ? IUD inserted--pt is unaware of when the IUD was placed. Pt had an MRI done this year at outpatient facility showing IUD . Pt had TVS in our office not showing IUD and recent CT scan from ER visit at [place] for pelvic pain showing no acute pathology on the CT abd/pelvis. Pt reports having pelvic pain on and off for the last year and worsening. Pt reports some increased bloating. Pt states starts in front and goes to back and occ starts from back and comes into front. Pt had colonoscopy 2021 and was neg and will make f/u with another GI.  Pt wishes to proceed with D&C and removal of IUD if found. Pt aware we may not find IUD or/and may have scar tissue form ablation making it difficult to dilate or remove IUD if embedded.  Pre-Op Consult: Operative Hysteroscopic IUD Removal, D&C  Surgery described in detail. Procedure to be performed at [facility]. Discussed risks of procedure to include, but not limited to: - blood loss and possible need for transfusion - infection - perforation with possible injury to viscus or blood vessel that could even require immediate surgical intervention for correction. - fluid issues and deficit. - inability to dilate or creation of false passage -anesthesia - inability to dilate or remove or fully remove the polyp/IUD - recurrence of polyps - potential malignancy and need for further surgery Pt can take Motrin, Tylenol or Advil for pelvic cramping  D/w pt normal vaginal bleeding on and off for 1-2 week. Pt to call me if she has heavy vaginal bleeding, severe pain or fever Pt can return to work the day after the procedure Nothing per vagina, no strenuous exercise and no tub bathing for 2 weeks after the procedure  Pt given Cytotec to take the night before procedure (400mcg - 2 tabs of 200mcg given to pt) I will review pathology results with pt in 7-10 days and sched f/u in 2 weeks  Plan is for Operative Hysteroscopic D&C, under sono guidance  30 min > 50% consultation.

## 2024-09-11 NOTE — HISTORY OF PRESENT ILLNESS
[FreeTextEntry1] : 53 yr old hx of endometrial ablation and ? IUD inserted--pt is unaware of when the IUD was placed. Pt had an MRI done this year at outpatient facility showing IUD . Pt had TVS in our office not showing IUD and recent CT scan from ER visit at [place] for pelvic pain showing no acute pathology on the CT abd/pelvis. Pt reports having pelvic pain on and off for the last year and worsening. Pt reports some increased bloating. Pt states starts in front and goes to back and occ starts from back and comes into front. Pt had colonoscopy 2021 and was neg and will make f/u with another GI.  Pt wishes to proceed with D&C and removal of IUD if found. Pt aware we may not find IUD or/and may have scar tissue form ablation making it difficult to dilate or remove IUD if embedded.  Pre-Op Consult: Operative Hysteroscopic IUD Removal, D&C  Surgery described in detail. Procedure to be performed at [facility]. Discussed risks of procedure to include, but not limited to: - blood loss and possible need for transfusion - infection - perforation with possible injury to viscus or blood vessel that could even require immediate surgical intervention for correction. - fluid issues and deficit. - inability to dilate or creation of false passage -anesthesia - inability to dilate or remove or fully remove the polyp/IUD - recurrence of polyps - potential malignancy and need for further surgery Pt can take Motrin, Tylenol or Advil for pelvic cramping  D/w pt normal vaginal bleeding on and off for 1-2 week. Pt to call me if she has heavy vaginal bleeding, severe pain or fever Pt can return to work the day after the procedure Nothing per vagina, no strenuous exercise and no tub bathing for 2 weeks after the procedure  Pt given Cytotec to take the night before procedure (400mcg - 2 tabs of 200mcg given to pt) I will review pathology results with pt in 7-10 days and sched f/u in 2 weeks  Plan is for Operative Hysteroscopic D&C, under sono guidance  30 min > 50% consultation.

## 2024-09-11 NOTE — H&P PST ADULT - NEGATIVE GENERAL GENITOURINARY SYMPTOMS
no hematuria/no renal colic/no flank pain L/no flank pain R/no gas in urine/no bladder infections/no incontinence/no dysuria/no urinary hesitancy/normal urinary frequency

## 2024-09-11 NOTE — H&P PST ADULT - PROBLEM SELECTOR PLAN 1
D&C ,Operative Hysteroscopy ,Removal Of IUD w Ultrasound  Guidance on 9/20/24.  Pre- Op Instructions discussed  labs sent

## 2024-09-11 NOTE — H&P PST ADULT - NSICDXPASTMEDICALHX_GEN_ALL_CORE_FT
PAST MEDICAL HISTORY:  Menorrhagia      PAST MEDICAL HISTORY:  Cerebral cysticercosis     Menorrhagia

## 2024-09-11 NOTE — H&P PST ADULT - HISTORY OF PRESENT ILLNESS
53 yr old female  hx of endometrial ablation and ? IUD -pt is unaware of when the IUD was placed. Pt had TVS in our office not showing IUD and recent CT scan from ER visit at [place] for pelvic pain showing no acute pathology on the CT abd/pelvis. Pt reports having pelvic pain on and off for the last year and worsening. Pt reports some increased bloating. Presents to PST for scheduled  w D&C and removal of IUD if found.  53 yr old female  hx of endometrial ablation,  ? IUD -pt is unaware of when the IUD was placed. Pt had TVS in our office not showing IUD and recent CT scan from ER visit at [place] for pelvic pain showing no acute pathology on the CT abd/pelvis. Pt reports having abdominal pain and  bloating and on and off for the last year and worsening . Pt was seen by GYN concern  Retained intrauterine contraceptive device  planned for D&C ,Operative Hysteroscopy ,Removal Of IUD w Ultrasound  Guidance on 9/20/24.    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 She has had no neurological symptoms but since has multiple cysts ? Cerebral cysticercosis followed ID and treated  (albendazole and praziquantel) x 2 weeks - follow up in 3 months ( next week  9/19/24 )  53 yr old female  hx of endometrial ablation,  ? IUD -pt is unaware of when the IUD was placed. Pt had TVS in our office not showing IUD and recent CT scan from ER visit at [place] for pelvic pain showing no acute pathology on the CT abd/pelvis. Pt reports having abdominal pain and  bloating and on and off for the last year and worsening . Pt was seen by GYN concern  Retained intrauterine contraceptive device  planned for D&C ,Operative Hysteroscopy ,Removal Of IUD w Ultrasound  Guidance on 9/20/24.    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 She has had no neurological symptoms but since has multiple cysts ? Cerebral cysticercosis followed ID and treated  (albendazole and praziquantel) x 2 weeks - follow up in 3 moths ( [id number] appointment 9/19/24 )

## 2024-09-11 NOTE — H&P PST ADULT - ASSESSMENT
ACTIVITY-    Energy Expenditure(Mets):      Symptoms-none     Airway:  normal    Mallampati-       Dental: Patient denies loose teeth   ACTIVITY-  active able to tolerate strenuous exercise   Energy Expenditure(Mets):    8.97  Symptoms-none     Airway:  normal    Mallampati-     1  Dental: Patient denies loose teeth

## 2024-09-12 ENCOUNTER — APPOINTMENT (OUTPATIENT)
Dept: OBGYN | Facility: CLINIC | Age: 54
End: 2024-09-12

## 2024-09-17 ENCOUNTER — NON-APPOINTMENT (OUTPATIENT)
Age: 54
End: 2024-09-17

## 2024-09-19 ENCOUNTER — APPOINTMENT (OUTPATIENT)
Dept: INFECTIOUS DISEASE | Facility: CLINIC | Age: 54
End: 2024-09-19
Payer: COMMERCIAL

## 2024-09-19 ENCOUNTER — NON-APPOINTMENT (OUTPATIENT)
Age: 54
End: 2024-09-19

## 2024-09-19 VITALS
BODY MASS INDEX: 20.83 KG/M2 | DIASTOLIC BLOOD PRESSURE: 82 MMHG | SYSTOLIC BLOOD PRESSURE: 128 MMHG | HEART RATE: 77 BPM | HEIGHT: 64 IN | TEMPERATURE: 98.4 F | WEIGHT: 122 LBS | OXYGEN SATURATION: 99 %

## 2024-09-19 DIAGNOSIS — B69.0 CYSTICERCOSIS OF CENTRAL NERVOUS SYSTEM: ICD-10-CM

## 2024-09-19 DIAGNOSIS — R14.0 ABDOMINAL DISTENSION (GASEOUS): ICD-10-CM

## 2024-09-19 PROCEDURE — 99213 OFFICE O/P EST LOW 20 MIN: CPT

## 2024-09-19 NOTE — ASSESSMENT
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  She was getting full work up for her abdominal pain and discomfort which is chronic. She had GI and GYN work up as well.  Tomorrow she is having hysteroscopy by her GYN physician.  She was treated with 2 weeks of albendazole and praziquantel and few days of steroid.  From ID stand point can proceed with the planned procedure tomorrow (by GYN). Will repeat MRI to see if any changes after about 4 months post treatment. No neuro symptoms.  RTC PRN Patient was given the opportunity to ask questions and all questions were answered to their satisfaction. Counseling included lab results, differential diagnosis, treatment options, risks and benefits, lifestyle changes, current condition, medications and dose adjustments. The patient verbalized understanding. [Treatment Education] : treatment education [Risk Reduction] : risk reduction [Universal Precautions] : universal precautions [Anticipatory Guidance] : anticipatory guidance

## 2024-09-19 NOTE — HISTORY OF PRESENT ILLNESS
Patient comes to clinic for follow up anticoagulation visit.  Last INR on 2/10/22 was 1.9.  Dose increased.   Today's INR is 1.9 and is below goal range.    Current warfarin total weekly dose of 16 mg verified.  Informed the INR result is below therapeutic range and instructed to maintain current dose per protocol. Discussed dose and return date of 2/25/22 for next INR. See Anticoagulation flowsheet.      Pt NON-VERBAL. [facility] INR today is 1.9, below range, and unchanged from previous INR 1.9 in eight days on increased dose of 16 mg/last 7 days from 15 mg/wk. Pt here today accompanied by spouse and reports strips for home INR machine are to arrive soon. Pt reports a poor appetite due to 3 teeth extractions and associated pain. Pt denies taking pain meds. Instructed pt/spouse to continue same dose and allow an additional week on dose of 16 mg/wk. Next INR on 2/24 with home INR machine. Pt to contact [facility] for appt if strips do not arrive.      Dr. Beth Cleaning is in the office today supervising the treatment.    Call your physician immediately if you notice any of the following symptoms of a blood clot:   · Sudden weakness in any limb  · Numbness or tingling anywhere  · Visual changes or loss of sight in either eye  · Sudden onset of slurred speech or inability to speak  · Dizziness or faintness  · New pain, swelling, redness or heat in any extremity  · New SOB or chest pain  Symptoms associated with blood clotting/low INR reviewed and verbalizes understanding.    Instructed to contact the clinic with any unusual bleeding or bruising, any changes in medications, diet, health status, lifestyle, or any other changes, questions or concerns. Verbalized understanding of all discussed.    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  She was getting full work up for her abdominal pain and discomfort which is chronic. She had GI and GYN work up as well.  Tomorrow she is having hysteroscopy by her GYN physician.  She was treated with 2 weeks of albendazole and praziquantel and few days of steroid. No side effects.  She is originally from [facility] but moved to [facility] and then . She eats a [employer] dish made with smoked pork.  No smoking, ETOH or drugs.

## 2024-09-20 ENCOUNTER — APPOINTMENT (OUTPATIENT)
Dept: OBGYN | Facility: HOSPITAL | Age: 54
End: 2024-09-20

## 2024-09-24 ENCOUNTER — NON-APPOINTMENT (OUTPATIENT)
Age: 54
End: 2024-09-24

## 2024-09-26 ENCOUNTER — NON-APPOINTMENT (OUTPATIENT)
Age: 54
End: 2024-09-26

## 2024-10-16 PROBLEM — B69.0 CYSTICERCOSIS OF CENTRAL NERVOUS SYSTEM: Chronic | Status: ACTIVE | Noted: 2024-09-11

## 2024-10-28 ENCOUNTER — APPOINTMENT (OUTPATIENT)
Dept: GASTROENTEROLOGY | Facility: CLINIC | Age: 54
End: 2024-10-28
Payer: COMMERCIAL

## 2024-10-28 VITALS
BODY MASS INDEX: 21.34 KG/M2 | SYSTOLIC BLOOD PRESSURE: 124 MMHG | DIASTOLIC BLOOD PRESSURE: 76 MMHG | HEIGHT: 64 IN | WEIGHT: 125 LBS

## 2024-10-28 DIAGNOSIS — R10.9 UNSPECIFIED ABDOMINAL PAIN: ICD-10-CM

## 2024-10-28 DIAGNOSIS — R19.7 DIARRHEA, UNSPECIFIED: ICD-10-CM

## 2024-10-28 DIAGNOSIS — R10.2 PELVIC AND PERINEAL PAIN: ICD-10-CM

## 2024-10-28 DIAGNOSIS — R14.0 ABDOMINAL DISTENSION (GASEOUS): ICD-10-CM

## 2024-10-28 PROCEDURE — 99214 OFFICE O/P EST MOD 30 MIN: CPT

## 2024-11-15 ENCOUNTER — OUTPATIENT (OUTPATIENT)
Dept: OUTPATIENT SERVICES | Facility: HOSPITAL | Age: 54
LOS: 1 days | End: 2024-11-15
Payer: COMMERCIAL

## 2024-11-15 VITALS
TEMPERATURE: 98 F | HEART RATE: 89 BPM | OXYGEN SATURATION: 100 % | WEIGHT: 123.9 LBS | SYSTOLIC BLOOD PRESSURE: 132 MMHG | RESPIRATION RATE: 18 BRPM | DIASTOLIC BLOOD PRESSURE: 77 MMHG | HEIGHT: 64 IN

## 2024-11-15 DIAGNOSIS — Z87.59 PERSONAL HISTORY OF OTHER COMPLICATIONS OF PREGNANCY, CHILDBIRTH AND THE PUERPERIUM: Chronic | ICD-10-CM

## 2024-11-15 DIAGNOSIS — T83.39XA OTHER MECHANICAL COMPLICATION OF INTRAUTERINE CONTRACEPTIVE DEVICE, INITIAL ENCOUNTER: ICD-10-CM

## 2024-11-15 DIAGNOSIS — Z98.890 OTHER SPECIFIED POSTPROCEDURAL STATES: Chronic | ICD-10-CM

## 2024-11-15 LAB
ANION GAP SERPL CALC-SCNC: 12 MMOL/L — SIGNIFICANT CHANGE UP (ref 5–17)
BUN SERPL-MCNC: 17 MG/DL — SIGNIFICANT CHANGE UP (ref 7–23)
CALCIUM SERPL-MCNC: 9.4 MG/DL — SIGNIFICANT CHANGE UP (ref 8.4–10.5)
CHLORIDE SERPL-SCNC: 104 MMOL/L — SIGNIFICANT CHANGE UP (ref 96–108)
CO2 SERPL-SCNC: 23 MMOL/L — SIGNIFICANT CHANGE UP (ref 22–31)
CREAT SERPL-MCNC: 0.64 MG/DL — SIGNIFICANT CHANGE UP (ref 0.5–1.3)
EGFR: 104 ML/MIN/1.73M2 — SIGNIFICANT CHANGE UP
GLUCOSE SERPL-MCNC: 91 MG/DL — SIGNIFICANT CHANGE UP (ref 70–99)
HCT VFR BLD CALC: 40.6 % — SIGNIFICANT CHANGE UP (ref 34.5–45)
HGB BLD-MCNC: 13.4 G/DL — SIGNIFICANT CHANGE UP (ref 11.5–15.5)
MCHC RBC-ENTMCNC: 30.9 PG — SIGNIFICANT CHANGE UP (ref 27–34)
MCHC RBC-ENTMCNC: 33 G/DL — SIGNIFICANT CHANGE UP (ref 32–36)
MCV RBC AUTO: 93.8 FL — SIGNIFICANT CHANGE UP (ref 80–100)
NRBC # BLD: 0 /100 WBCS — SIGNIFICANT CHANGE UP (ref 0–0)
PLATELET # BLD AUTO: 267 K/UL — SIGNIFICANT CHANGE UP (ref 150–400)
POTASSIUM SERPL-MCNC: 3.9 MMOL/L — SIGNIFICANT CHANGE UP (ref 3.5–5.3)
POTASSIUM SERPL-SCNC: 3.9 MMOL/L — SIGNIFICANT CHANGE UP (ref 3.5–5.3)
RBC # BLD: 4.33 M/UL — SIGNIFICANT CHANGE UP (ref 3.8–5.2)
RBC # FLD: 12.4 % — SIGNIFICANT CHANGE UP (ref 10.3–14.5)
SODIUM SERPL-SCNC: 139 MMOL/L — SIGNIFICANT CHANGE UP (ref 135–145)
WBC # BLD: 8.38 K/UL — SIGNIFICANT CHANGE UP (ref 3.8–10.5)
WBC # FLD AUTO: 8.38 K/UL — SIGNIFICANT CHANGE UP (ref 3.8–10.5)

## 2024-11-15 PROCEDURE — 85027 COMPLETE CBC AUTOMATED: CPT

## 2024-11-15 PROCEDURE — 80048 BASIC METABOLIC PNL TOTAL CA: CPT

## 2024-11-15 PROCEDURE — G0463: CPT

## 2024-11-15 NOTE — H&P PST ADULT - NSALCOHOLTYPE_GEN__A_CORE_SD
Patient called the clinical care line requesting medication refill. Chart reviewed and medication sent to the pharmacy.   Electronically signed by Jacque Talavera on 5/7/2019 at 3:39 PM wine

## 2024-11-15 NOTE — H&P PST ADULT - NSICDXPASTMEDICALHX_GEN_ALL_CORE_FT
PAST MEDICAL HISTORY:  Brain cyst     Endometriosis     Retained intrauterine contraceptive device (IUD)

## 2024-11-15 NOTE — H&P PST ADULT - PROBLEM SELECTOR PLAN 1
D&C, Operative Hysteroscopy w/ Aveta, Removal of IUD on 12/6/24.   Pre-op instructions provided and questions answered. Pt verbalized understanding.

## 2024-11-15 NOTE — H&P PST ADULT - HISTORY OF PRESENT ILLNESS
54 y/o Female with PMHx significant for endometriosis s/p endometrial ablation, and  x 2 who reports a 1.5 year history of pelvic pain radiating to lower back.  She reports undergoing MRI A&P which showed "a retained IUD," She is now scheduled for D&C, Operative Hysteroscopy w/ Aveta, Removal of IUD on 24. She denies vaginal bleeding, spotting, hematuria, CP, palps, SOB, dizziness, fever or chills.     She was originally scheduled for the above procedure on 24; however, it was postponed due to abnormal brain MRI. MRI on 24 revealed "several cysts in the right temporal lobe...differential includes an infectious etiology such as neurocysticercosis," full report in [employer]. She was subsequently evaluated by neurologist (Dr. Bhandari) and I/D Dr. Mandy Landis on 3/21/24. She was treated with 2 medications (albendazole and praziquantel). She followed up with Dr. Landis on 24 and underwent another brain MRI which was "stable without new cysts," full report in [employer].     52 y/o Female with PMHx significant for endometriosis s/p endometrial ablation, and  x 2 who reports a 1.5 year history of pelvic pain radiating to lower back.  She reports undergoing MRI A&P which showed "a retained IUD," She is now scheduled for D&C, Operative Hysteroscopy w/ Aveta, Removal of IUD on 24. She denies vaginal bleeding, spotting, hematuria, CP, palps, SOB, dizziness, fever or chills.     She was originally scheduled for the above procedure on 24; however, it was canceled due to abnormal brain MRI. MRI on 24 revealed "several cysts in the right temporal lobe...differential includes an infectious etiology such as neurocysticercosis," full report in [employer]. She was subsequently evaluated by neurologist (Dr. Bhandari) and I/D Dr. Mandy Landis on 3/21/24. She was treated with 2 medications (albendazole and praziquantel). She followed up with Dr. Landis on 24 and underwent another brain MRI which was "stable without new cysts," full report in [employer].

## 2024-11-15 NOTE — H&P PST ADULT - ASSESSMENT
DASI score: 8.42 mets  DASI activity: run a short distance, carry groceries, dancing   Loose teeth or denture: no     MP 2

## 2024-11-15 NOTE — H&P PST ADULT - OTHER CARE PROVIDERS
I/D - Mandy Landis [phone] last eval 9/19/24; neurologist - Dr. Amadeo Bhandari [phone]

## 2024-11-16 PROBLEM — B69.0 CYSTICERCOSIS OF CENTRAL NERVOUS SYSTEM: Chronic | Status: INACTIVE | Noted: 2024-09-11 | Resolved: 2024-11-15

## 2024-11-16 PROBLEM — N92.0 EXCESSIVE AND FREQUENT MENSTRUATION WITH REGULAR CYCLE: Chronic | Status: INACTIVE | Noted: 2017-12-21 | Resolved: 2024-11-15

## 2024-12-06 ENCOUNTER — OUTPATIENT (OUTPATIENT)
Dept: OUTPATIENT SERVICES | Facility: HOSPITAL | Age: 54
LOS: 1 days | End: 2024-12-06
Payer: COMMERCIAL

## 2024-12-06 ENCOUNTER — APPOINTMENT (OUTPATIENT)
Dept: OBGYN | Facility: HOSPITAL | Age: 54
End: 2024-12-06

## 2024-12-06 ENCOUNTER — TRANSCRIPTION ENCOUNTER (OUTPATIENT)
Age: 54
End: 2024-12-06

## 2024-12-06 ENCOUNTER — RESULT REVIEW (OUTPATIENT)
Age: 54
End: 2024-12-06

## 2024-12-06 VITALS
HEIGHT: 64 IN | DIASTOLIC BLOOD PRESSURE: 47 MMHG | SYSTOLIC BLOOD PRESSURE: 103 MMHG | HEART RATE: 69 BPM | RESPIRATION RATE: 16 BRPM | TEMPERATURE: 97 F | OXYGEN SATURATION: 99 % | WEIGHT: 123.9 LBS

## 2024-12-06 VITALS
SYSTOLIC BLOOD PRESSURE: 115 MMHG | RESPIRATION RATE: 12 BRPM | HEART RATE: 63 BPM | DIASTOLIC BLOOD PRESSURE: 58 MMHG | OXYGEN SATURATION: 100 %

## 2024-12-06 DIAGNOSIS — Z98.890 OTHER SPECIFIED POSTPROCEDURAL STATES: Chronic | ICD-10-CM

## 2024-12-06 DIAGNOSIS — Z87.59 PERSONAL HISTORY OF OTHER COMPLICATIONS OF PREGNANCY, CHILDBIRTH AND THE PUERPERIUM: Chronic | ICD-10-CM

## 2024-12-06 PROCEDURE — C1782: CPT

## 2024-12-06 PROCEDURE — 58558 HYSTEROSCOPY BIOPSY: CPT

## 2024-12-06 PROCEDURE — 88305 TISSUE EXAM BY PATHOLOGIST: CPT

## 2024-12-06 PROCEDURE — 88305 TISSUE EXAM BY PATHOLOGIST: CPT | Mod: 26

## 2024-12-06 PROCEDURE — 76998 US GUIDE INTRAOP: CPT

## 2024-12-06 DEVICE — AVETA SMOL RESECTING DEVICE 2.9MM: Type: IMPLANTABLE DEVICE | Status: FUNCTIONAL

## 2024-12-06 RX ORDER — FENTANYL 12 UG/H
25 PATCH, EXTENDED RELEASE TRANSDERMAL
Refills: 0 | Status: DISCONTINUED | OUTPATIENT
Start: 2024-12-06 | End: 2024-12-06

## 2024-12-06 RX ORDER — SODIUM CHLORIDE 9 MG/ML
3 INJECTION, SOLUTION INTRAMUSCULAR; INTRAVENOUS; SUBCUTANEOUS EVERY 8 HOURS
Refills: 0 | Status: ACTIVE | OUTPATIENT
Start: 2024-12-06 | End: 2025-11-04

## 2024-12-06 RX ORDER — 0.9 % SODIUM CHLORIDE 0.9 %
1000 INTRAVENOUS SOLUTION INTRAVENOUS
Refills: 0 | Status: ACTIVE | OUTPATIENT
Start: 2024-12-06 | End: 2025-11-04

## 2024-12-06 RX ORDER — ONDANSETRON HYDROCHLORIDE 4 MG/1
4 TABLET, FILM COATED ORAL ONCE
Refills: 0 | Status: ACTIVE | OUTPATIENT
Start: 2024-12-06 | End: 2025-11-04

## 2024-12-06 RX ORDER — LIDOCAINE HCL 20 MG/ML
0.2 VIAL (ML) INJECTION ONCE
Refills: 0 | Status: ACTIVE | OUTPATIENT
Start: 2024-12-06

## 2024-12-06 RX ADMIN — Medication 100 MILLILITER(S): at 09:46

## 2024-12-06 NOTE — ASU DISCHARGE PLAN (ADULT/PEDIATRIC) - NURSING INSTRUCTIONS
Your first dose of Tylenol was given at _______2:00 PM____. Next dose of Tylenol will be on or after ________8:00 PM___ ,today/tonight and every 6 hours afterwards as needed for pain management, do not take any Tylenol containing products until this time.  Do not exceed more than 4000mg of Tylenol in one 24 hour setting. If no contraindications, you may alternate with Ibuprofen 3 hours after dose of Tylenol. Ibuprofen can be taken every 6 hours, received at 2:00 PM. Your first dose of Tylenol was given at _______1:10PM____. Next dose of Tylenol will be on or after ________7:10 PM___ ,today/tonight and every 6 hours afterwards as needed for pain management, do not take any Tylenol containing products until this time.  Do not exceed more than 4000mg of Tylenol in one 24 hour setting. If no contraindications, you may alternate with Ibuprofen 3 hours after dose of Tylenol. Ibuprofen can be taken every 6 hours, received at 2:15 PM.

## 2024-12-06 NOTE — PRE-ANESTHESIA EVALUATION ADULT - NSANTHPMHFT_GEN_ALL_CORE
MRI on 2/13/24 revealed "several cysts in the right temporal lobe...differential includes an infectious etiology such as neurocysticercosis," full report in [employer]. She was subsequently evaluated by neurologist (Dr. Bhandari) and I/D Dr. Mandy Landis on 3/21/24. She was treated with 2 medications (albendazole and praziquantel). She followed up with Dr. Landis on 9/19/24 and underwent another brain MRI which was "stable without new cysts," MRI on 2/13/24 revealed "several cysts in the right temporal lobe...differential includes an infectious etiology such as neurocysticercosis,". She was subsequently evaluated by neurologist (Dr. Bhandari) and I/D Dr. Mandy Landis on 3/21/24. She was treated with 2 medications (albendazole and praziquantel). She followed up with Dr. Landis on 9/19/24 and underwent another brain MRI which was "stable without new cysts,"

## 2024-12-06 NOTE — ASU DISCHARGE PLAN (ADULT/PEDIATRIC) - FINANCIAL ASSISTANCE
[facility] provides services at a reduced cost to those who are determined to be eligible through [facility]’s financial assistance program. Information regarding [facility]’s financial assistance program can be found by going to [email or web address] or by calling [phone].

## 2024-12-06 NOTE — BRIEF OPERATIVE NOTE - NSICDXBRIEFPOSTOP_GEN_ALL_CORE_FT
POST-OP DIAGNOSIS:  Atrophic endometrium 06-Dec-2024 14:31:05  Gabrielle Vaca  Uterine polyp 06-Dec-2024 14:31:15  Gabrielle Vaca

## 2024-12-06 NOTE — BRIEF OPERATIVE NOTE - NSICDXBRIEFPREOP_GEN_ALL_CORE_FT
PRE-OP DIAGNOSIS:  Retained portions of placenta without hemorrhage 06-Dec-2024 14:30:56  Gabrielle Vaca

## 2024-12-06 NOTE — BRIEF OPERATIVE NOTE - OPERATION/FINDINGS
After patient was placed under adequate anesthesia and placed in dorsolithotomy position, a single tooth tenaculum was used to grasp the cervix. The cervix was gradually dilated to 5.5mm. The Aveta hysteroscope was inserted into the uterine cavity with no obvious ostia seen, no other areas of false passage or obvious paths of entry seen. In order to obtain better visualization of the uterine the uterine cavity, ultrasound guidance was used to visualize the fundus and insertion of dilators into the cavity up to the fundus. Once placement was confirmed, the hysteroscope was reinserted to visualize the cavity once more. A possible small polyp was noted. The endometrium appeared atrophic. No obvious ostia were visualized, no IUD or other objects were visualized in the cavity. The hysteroscope was removed and a pipelle was used to obtain an endometrial biopsy. Endometrial sampling was placed on Telfa pad. All instruments were then removed from the vagina. Excellent hemostasis was noted. Sponge count was correct. The patien was taken to PACU in stable condition.

## 2024-12-06 NOTE — ASU DISCHARGE PLAN (ADULT/PEDIATRIC) - MEDICATION INSTRUCTIONS
Take Motrin (Ibuprofen) 600mg every 6 hours; Take Tylenol (Acetaminophen) 975mg every 6 hours; alternate between Motrin and Tylenol every 3 hours as needed.

## 2024-12-09 PROBLEM — G93.0 CEREBRAL CYSTS: Chronic | Status: ACTIVE | Noted: 2024-11-15

## 2024-12-09 PROBLEM — T83.39XA OTHER MECHANICAL COMPLICATION OF INTRAUTERINE CONTRACEPTIVE DEVICE, INITIAL ENCOUNTER: Chronic | Status: ACTIVE | Noted: 2024-11-15

## 2024-12-09 PROBLEM — N80.9 ENDOMETRIOSIS, UNSPECIFIED: Chronic | Status: ACTIVE | Noted: 2024-11-15

## 2024-12-10 LAB — SURGICAL PATHOLOGY STUDY: SIGNIFICANT CHANGE UP

## 2024-12-12 ENCOUNTER — APPOINTMENT (OUTPATIENT)
Dept: INFECTIOUS DISEASE | Facility: CLINIC | Age: 54
End: 2024-12-12

## 2025-01-13 ENCOUNTER — APPOINTMENT (OUTPATIENT)
Dept: OBGYN | Facility: CLINIC | Age: 55
End: 2025-01-13
Payer: COMMERCIAL

## 2025-01-13 VITALS — SYSTOLIC BLOOD PRESSURE: 102 MMHG | DIASTOLIC BLOOD PRESSURE: 70 MMHG

## 2025-01-13 DIAGNOSIS — N90.89 OTHER SPECIFIED NONINFLAMMATORY DISORDERS OF VULVA AND PERINEUM: ICD-10-CM

## 2025-01-13 DIAGNOSIS — R10.2 PELVIC AND PERINEAL PAIN: ICD-10-CM

## 2025-01-13 PROCEDURE — 99459 PELVIC EXAMINATION: CPT

## 2025-01-13 PROCEDURE — 99213 OFFICE O/P EST LOW 20 MIN: CPT | Mod: 25

## 2025-01-13 PROCEDURE — 56605 BIOPSY OF VULVA/PERINEUM: CPT

## 2025-01-31 ENCOUNTER — APPOINTMENT (OUTPATIENT)
Dept: OBGYN | Facility: CLINIC | Age: 55
End: 2025-01-31

## 2025-02-20 ENCOUNTER — OUTPATIENT (OUTPATIENT)
Dept: OUTPATIENT SERVICES | Facility: HOSPITAL | Age: 55
LOS: 1 days | End: 2025-02-20
Payer: COMMERCIAL

## 2025-02-20 ENCOUNTER — APPOINTMENT (OUTPATIENT)
Dept: MRI IMAGING | Facility: CLINIC | Age: 55
End: 2025-02-20

## 2025-02-20 DIAGNOSIS — Z87.59 PERSONAL HISTORY OF OTHER COMPLICATIONS OF PREGNANCY, CHILDBIRTH AND THE PUERPERIUM: Chronic | ICD-10-CM

## 2025-02-20 DIAGNOSIS — R10.2 PELVIC AND PERINEAL PAIN: ICD-10-CM

## 2025-02-20 DIAGNOSIS — Z98.890 OTHER SPECIFIED POSTPROCEDURAL STATES: Chronic | ICD-10-CM

## 2025-02-20 DIAGNOSIS — Z00.8 ENCOUNTER FOR OTHER GENERAL EXAMINATION: ICD-10-CM

## 2025-02-20 PROCEDURE — A9585: CPT

## 2025-02-20 PROCEDURE — 72197 MRI PELVIS W/O & W/DYE: CPT

## 2025-02-20 PROCEDURE — 72197 MRI PELVIS W/O & W/DYE: CPT | Mod: 26

## 2025-03-03 ENCOUNTER — APPOINTMENT (OUTPATIENT)
Dept: OBGYN | Facility: CLINIC | Age: 55
End: 2025-03-03
Payer: COMMERCIAL

## 2025-03-03 VITALS — DIASTOLIC BLOOD PRESSURE: 61 MMHG | SYSTOLIC BLOOD PRESSURE: 110 MMHG

## 2025-03-03 DIAGNOSIS — N83.202 UNSPECIFIED OVARIAN CYST, LEFT SIDE: ICD-10-CM

## 2025-03-03 DIAGNOSIS — A63.0 ANOGENITAL (VENEREAL) WARTS: ICD-10-CM

## 2025-03-03 DIAGNOSIS — N91.5 OLIGOMENORRHEA, UNSPECIFIED: ICD-10-CM

## 2025-03-03 DIAGNOSIS — R10.9 UNSPECIFIED ABDOMINAL PAIN: ICD-10-CM

## 2025-03-03 DIAGNOSIS — N90.89 OTHER SPECIFIED NONINFLAMMATORY DISORDERS OF VULVA AND PERINEUM: ICD-10-CM

## 2025-03-03 DIAGNOSIS — Z01.419 ENCOUNTER FOR GYNECOLOGICAL EXAMINATION (GENERAL) (ROUTINE) W/OUT ABNORMAL FINDINGS: ICD-10-CM

## 2025-03-03 PROCEDURE — 99214 OFFICE O/P EST MOD 30 MIN: CPT | Mod: 25

## 2025-03-03 PROCEDURE — 56606 BIOPSY OF VULVA/PERINEUM: CPT | Mod: 59

## 2025-03-03 PROCEDURE — 36415 COLL VENOUS BLD VENIPUNCTURE: CPT

## 2025-03-03 PROCEDURE — 56605 BIOPSY OF VULVA/PERINEUM: CPT

## 2025-03-03 PROCEDURE — 99459 PELVIC EXAMINATION: CPT

## 2025-03-03 RX ORDER — IMIQUIMOD 50 MG/G
5 CREAM TOPICAL
Qty: 1 | Refills: 0 | Status: ACTIVE | COMMUNITY
Start: 2025-03-03 | End: 1900-01-01

## 2025-03-03 RX ORDER — IMIQUIMOD 37.5 MG/G
3.75 CREAM TOPICAL
Qty: 1 | Refills: 1 | Status: ACTIVE | COMMUNITY
Start: 2025-03-03 | End: 1900-01-01

## 2025-03-04 ENCOUNTER — NON-APPOINTMENT (OUTPATIENT)
Age: 55
End: 2025-03-04

## 2025-03-04 LAB
CANCER AG125 SERPL-ACNC: 19 U/ML
ESTRADIOL SERPL-MCNC: 13 PG/ML
FSH SERPL-MCNC: 104 IU/L
LH SERPL-ACNC: 59.4 IU/L
T4 FREE SERPL-MCNC: 1.1 NG/DL
TSH SERPL-ACNC: 2.25 UIU/ML

## 2025-03-05 ENCOUNTER — NON-APPOINTMENT (OUTPATIENT)
Age: 55
End: 2025-03-05

## 2025-03-26 ENCOUNTER — NON-APPOINTMENT (OUTPATIENT)
Age: 55
End: 2025-03-26

## 2025-03-26 NOTE — ED STATDOCS - NSDCPRINTRESULTS_ED_ALL_ED
Where Is Your Acne Located?: Face
Patient requests all Lab, Cardiology, and Radiology Results on their Discharge Instructions

## 2025-04-18 ENCOUNTER — APPOINTMENT (OUTPATIENT)
Dept: OBGYN | Facility: CLINIC | Age: 55
End: 2025-04-18

## 2025-04-22 ENCOUNTER — APPOINTMENT (OUTPATIENT)
Dept: OBGYN | Facility: CLINIC | Age: 55
End: 2025-04-22

## 2025-05-05 ENCOUNTER — APPOINTMENT (OUTPATIENT)
Dept: OBGYN | Facility: CLINIC | Age: 55
End: 2025-05-05

## 2025-06-24 NOTE — HISTORY OF PRESENT ILLNESS
[FreeTextEntry1] : Patient here today for screening colonoscopy asymptomatic from time to time. She has indigestion, likely related to eating, close to bedtime. No dysphagia no water brash
NEGATIVE

## 2025-07-23 ENCOUNTER — NON-APPOINTMENT (OUTPATIENT)
Age: 55
End: 2025-07-23

## (undated) DEVICE — GOWN TRIMAX LG

## (undated) DEVICE — POSITIONER FOAM EGG CRATE ULNAR 2PCS (PINK)

## (undated) DEVICE — GLV 6.5 PROTEXIS (WHITE)

## (undated) DEVICE — AVETA CORAL HYSTEROSCOPE 4.6MM DISP

## (undated) DEVICE — NSA-VIDEO TOWER - STRYKER: Type: DURABLE MEDICAL EQUIPMENT

## (undated) DEVICE — PREP BETADINE KIT

## (undated) DEVICE — OS FINDER

## (undated) DEVICE — SYR LUER LOK 10CC

## (undated) DEVICE — Device

## (undated) DEVICE — SOL IRR POUR NS 0.9% 500ML

## (undated) DEVICE — AVETA FLUID MANAGEMENT ACCESSORY

## (undated) DEVICE — DRAPE LIGHT HANDLE COVER (GREEN)

## (undated) DEVICE — SYR ASEPTO

## (undated) DEVICE — DRAPE 1/2 SHEET 40X57"

## (undated) DEVICE — CURETTE ENDOMETRIAL GYNOSAMPLER 23.6CM

## (undated) DEVICE — FOLEY CATH 2-WAY 16FR 5CC SILICONE

## (undated) DEVICE — PACK LITHOTOMY

## (undated) DEVICE — ACCESSORY AVETA WASTE MANAGEMENT 3MM

## (undated) DEVICE — WARMING BLANKET UPPER ADULT

## (undated) DEVICE — SOL IRR BAG NS 0.9% 3000ML